# Patient Record
Sex: FEMALE | NOT HISPANIC OR LATINO | Employment: UNEMPLOYED | URBAN - METROPOLITAN AREA
[De-identification: names, ages, dates, MRNs, and addresses within clinical notes are randomized per-mention and may not be internally consistent; named-entity substitution may affect disease eponyms.]

---

## 2018-11-28 ENCOUNTER — OFFICE VISIT (OUTPATIENT)
Dept: FAMILY MEDICINE CLINIC | Facility: CLINIC | Age: 43
End: 2018-11-28

## 2018-11-28 VITALS
OXYGEN SATURATION: 94 % | TEMPERATURE: 96.4 F | BODY MASS INDEX: 29.49 KG/M2 | RESPIRATION RATE: 16 BRPM | HEIGHT: 65 IN | HEART RATE: 99 BPM | DIASTOLIC BLOOD PRESSURE: 84 MMHG | WEIGHT: 177 LBS | SYSTOLIC BLOOD PRESSURE: 126 MMHG

## 2018-11-28 DIAGNOSIS — R73.09 ELEVATED GLUCOSE LEVEL: ICD-10-CM

## 2018-11-28 DIAGNOSIS — N91.2 AMENORRHEA: ICD-10-CM

## 2018-11-28 DIAGNOSIS — Z23 NEED FOR INFLUENZA VACCINATION: ICD-10-CM

## 2018-11-28 DIAGNOSIS — R53.83 FATIGUE, UNSPECIFIED TYPE: ICD-10-CM

## 2018-11-28 DIAGNOSIS — E11.8 TYPE 2 DIABETES MELLITUS WITH COMPLICATION, WITHOUT LONG-TERM CURRENT USE OF INSULIN (HCC): Primary | ICD-10-CM

## 2018-11-28 LAB
25(OH)D3 SERPL-MCNC: 19.5 NG/ML
ALBUMIN SERPL-MCNC: 4.51 G/DL (ref 3.2–4.8)
ALBUMIN/GLOB SERPL: 1.4 G/DL (ref 1.5–2.5)
ALP SERPL-CCNC: 129 U/L (ref 25–100)
ALT SERPL W P-5'-P-CCNC: 89 U/L (ref 7–40)
ANION GAP SERPL CALCULATED.3IONS-SCNC: 4 MMOL/L (ref 3–11)
AST SERPL-CCNC: 48 U/L (ref 0–33)
BASOPHILS # BLD AUTO: 0.03 10*3/MM3 (ref 0–0.2)
BASOPHILS NFR BLD AUTO: 0.4 % (ref 0–1)
BILIRUB BLD-MCNC: NEGATIVE MG/DL
BILIRUB SERPL-MCNC: 0.2 MG/DL (ref 0.3–1.2)
BUN BLD-MCNC: 15 MG/DL (ref 9–23)
BUN/CREAT SERPL: 15.3 (ref 7–25)
CALCIUM SPEC-SCNC: 9.9 MG/DL (ref 8.7–10.4)
CHLORIDE SERPL-SCNC: 98 MMOL/L (ref 99–109)
CLARITY, POC: CLEAR
CO2 SERPL-SCNC: 28 MMOL/L (ref 20–31)
COLOR UR: YELLOW
CREAT BLD-MCNC: 0.98 MG/DL (ref 0.6–1.3)
DEPRECATED RDW RBC AUTO: 41.7 FL (ref 37–54)
EOSINOPHIL # BLD AUTO: 0.19 10*3/MM3 (ref 0–0.3)
EOSINOPHIL NFR BLD AUTO: 2.7 % (ref 0–3)
ERYTHROCYTE [DISTWIDTH] IN BLOOD BY AUTOMATED COUNT: 13.1 % (ref 11.3–14.5)
FOLATE SERPL-MCNC: 14.34 NG/ML (ref 3.2–20)
GFR SERPL CREATININE-BSD FRML MDRD: 59 ML/MIN/1.73
GFR SERPL CREATININE-BSD FRML MDRD: 72 ML/MIN/1.73
GLOBULIN UR ELPH-MCNC: 3.3 GM/DL
GLUCOSE BLD-MCNC: 606 MG/DL (ref 70–100)
GLUCOSE BLDC GLUCOMTR-MCNC: NORMAL MG/DL (ref 70–130)
GLUCOSE UR STRIP-MCNC: ABNORMAL MG/DL
HBA1C MFR BLD: 9.8 %
HBA1C MFR BLD: 9.8 % (ref 4.8–5.6)
HCT VFR BLD AUTO: 40.5 % (ref 34.5–44)
HGB BLD-MCNC: 12.2 G/DL (ref 11.5–15.5)
IMM GRANULOCYTES # BLD: 0.02 10*3/MM3 (ref 0–0.03)
IMM GRANULOCYTES NFR BLD: 0.3 % (ref 0–0.6)
KETONES UR QL: NEGATIVE
LEUKOCYTE EST, POC: NEGATIVE
LYMPHOCYTES # BLD AUTO: 2.09 10*3/MM3 (ref 0.6–4.8)
LYMPHOCYTES NFR BLD AUTO: 29.4 % (ref 24–44)
MCH RBC QN AUTO: 26.1 PG (ref 27–31)
MCHC RBC AUTO-ENTMCNC: 30.1 G/DL (ref 32–36)
MCV RBC AUTO: 86.7 FL (ref 80–99)
MONOCYTES # BLD AUTO: 0.61 10*3/MM3 (ref 0–1)
MONOCYTES NFR BLD AUTO: 8.6 % (ref 0–12)
NEUTROPHILS # BLD AUTO: 4.18 10*3/MM3 (ref 1.5–8.3)
NEUTROPHILS NFR BLD AUTO: 58.9 % (ref 41–71)
NITRITE UR-MCNC: NEGATIVE MG/ML
PH UR: 6 [PH] (ref 5–8)
PLATELET # BLD AUTO: 345 10*3/MM3 (ref 150–450)
PMV BLD AUTO: 10.7 FL (ref 6–12)
POTASSIUM BLD-SCNC: 5 MMOL/L (ref 3.5–5.5)
PROT SERPL-MCNC: 7.8 G/DL (ref 5.7–8.2)
PROT UR STRIP-MCNC: NEGATIVE MG/DL
RBC # BLD AUTO: 4.67 10*6/MM3 (ref 3.89–5.14)
RBC # UR STRIP: ABNORMAL /UL
SODIUM BLD-SCNC: 130 MMOL/L (ref 132–146)
SP GR UR: 1.01 (ref 1–1.03)
TSH SERPL DL<=0.05 MIU/L-ACNC: 1.27 MIU/ML (ref 0.35–5.35)
UROBILINOGEN UR QL: NORMAL
VIT B12 BLD-MCNC: 346 PG/ML (ref 211–911)
WBC NRBC COR # BLD: 7.1 10*3/MM3 (ref 3.5–10.8)

## 2018-11-28 PROCEDURE — 81003 URINALYSIS AUTO W/O SCOPE: CPT | Performed by: NURSE PRACTITIONER

## 2018-11-28 PROCEDURE — 90471 IMMUNIZATION ADMIN: CPT | Performed by: NURSE PRACTITIONER

## 2018-11-28 PROCEDURE — 83036 HEMOGLOBIN GLYCOSYLATED A1C: CPT | Performed by: NURSE PRACTITIONER

## 2018-11-28 PROCEDURE — 90686 IIV4 VACC NO PRSV 0.5 ML IM: CPT | Performed by: NURSE PRACTITIONER

## 2018-11-28 PROCEDURE — 36415 COLL VENOUS BLD VENIPUNCTURE: CPT | Performed by: NURSE PRACTITIONER

## 2018-11-28 PROCEDURE — 80053 COMPREHEN METABOLIC PANEL: CPT | Performed by: NURSE PRACTITIONER

## 2018-11-28 PROCEDURE — 82306 VITAMIN D 25 HYDROXY: CPT | Performed by: NURSE PRACTITIONER

## 2018-11-28 PROCEDURE — 84443 ASSAY THYROID STIM HORMONE: CPT | Performed by: NURSE PRACTITIONER

## 2018-11-28 PROCEDURE — 82607 VITAMIN B-12: CPT | Performed by: NURSE PRACTITIONER

## 2018-11-28 PROCEDURE — 85025 COMPLETE CBC W/AUTO DIFF WBC: CPT | Performed by: NURSE PRACTITIONER

## 2018-11-28 PROCEDURE — 82962 GLUCOSE BLOOD TEST: CPT | Performed by: NURSE PRACTITIONER

## 2018-11-28 PROCEDURE — 99205 OFFICE O/P NEW HI 60 MIN: CPT | Performed by: NURSE PRACTITIONER

## 2018-11-28 PROCEDURE — 82746 ASSAY OF FOLIC ACID SERUM: CPT | Performed by: NURSE PRACTITIONER

## 2018-11-28 RX ORDER — METFORMIN HYDROCHLORIDE EXTENDED-RELEASE TABLETS 1000 MG/1
1000 TABLET, FILM COATED, EXTENDED RELEASE ORAL
Qty: 30 TABLET | Refills: 1 | Status: SHIPPED | OUTPATIENT
Start: 2018-11-28 | End: 2018-11-29

## 2018-11-28 RX ORDER — BLOOD-GLUCOSE METER
1 KIT MISCELLANEOUS AS NEEDED
Qty: 1 EACH | Refills: 5 | Status: SHIPPED | OUTPATIENT
Start: 2018-11-28 | End: 2019-11-28

## 2018-11-28 NOTE — PROGRESS NOTES
"Gabriel Pinto is a 53 y.o. female who presents today to establish care and evaluation of her diabetes.     Chief Complaint   Patient presents with   • Menopause   • Diabetes       HPI     Patient found out one year ago that she had type 2 diabetes. She had been on medication until a few days ago when she ran out but does not know what the medication was. She was taking one \"little pill\" once a day. She has been having headaches, dizziness, polyuria, and polydipsia. She has not had a menses for one year. She saw an OBGYN in [facility] who thought she was pregnant one year ago, but then told her she was not. At that time she was having suprapubic pain that wrapped around into her low back but is no longer having the pain.    The following portions of the patient's history were reviewed and updated as appropriate: allergies, current medications, past family history, past medical history, past social history, past surgical history and problem list.     Past Medical History:   Diagnosis Date   • Diabetes mellitus (CMS/[facility])        History reviewed. No pertinent surgical history.    History reviewed. No pertinent family history.    Social History     Socioeconomic History   • Marital status:      Spouse name: Not on file   • Number of children: Not on file   • Years of education: Not on file   • Highest education level: Not on file   Social Needs   • Financial resource strain: Not on file   • Food insecurity - worry: Not on file   • Food insecurity - inability: Not on file   • Transportation needs - medical: Not on file   • Transportation needs - non-medical: Not on file   Occupational History   • Not on file   Tobacco Use   • Smoking status: Never Smoker   • Smokeless tobacco: Never Used   Substance and Sexual Activity   • Alcohol use: No     Frequency: Never   • Drug use: No   • Sexual activity: Defer   Other Topics Concern   • Not on file   Social History Narrative   • Not on file       No Known " "Allergies      Current Outpatient Medications:   •  glucose monitoring kit (FREESTYLE) monitoring kit, 1 each As Needed (type 2 diabetes)., Disp: 1 each, Rfl: 5  •  metFORMIN (FORTAMET) 1000 MG (OSM) 24 hr tablet, Take 1 tablet by mouth Daily With Breakfast., Disp: 30 tablet, Rfl: 1  No current facility-administered medications for this visit.     ROS    Review of Systems   Gastrointestinal: Positive for abdominal pain.   Endocrine: Positive for polydipsia and polyuria.   Genitourinary: Positive for flank pain.   Neurological: Positive for light-headedness and headache.   All other systems reviewed and are negative.      Visit Vitals  /84   Pulse 99   Temp 96.4 °F (35.8 °C) (Temporal)   Resp 16   Ht 165.1 cm (65\")   Wt 80.3 kg (177 lb)   SpO2 94%   BMI 29.45 kg/m²       Physical Exam     Physical Exam   Constitutional: She is oriented to person, place, and time. She appears well-developed and well-nourished.   HENT:   Head: Normocephalic and atraumatic.   Eyes: Conjunctivae and EOM are normal. Pupils are equal, round, and reactive to light.   Cardiovascular: Normal rate, regular rhythm, normal heart sounds and intact distal pulses. Exam reveals no gallop and no friction rub.   No murmur heard.  Pulmonary/Chest: Effort normal and breath sounds normal. No stridor. No respiratory distress. She has no wheezes. She has no rales.   Abdominal: Soft. Bowel sounds are normal.   Musculoskeletal: Normal range of motion.   Neurological: She is alert and oriented to person, place, and time. She has normal strength.   Skin: Skin is warm and dry. Capillary refill takes less than 2 seconds.   Psychiatric: She has a normal mood and affect. Her behavior is normal. Judgment and thought content normal.   Nursing note and vitals reviewed.      Assessment/Plan    Problem List Items Addressed This Visit     Amenorrhea    Relevant Orders    Ambulatory Referral to Gynecology    Type 2 diabetes mellitus with complication, without " "long-term current use of insulin (CMS/[facility]) - Primary    Relevant Medications    glucose monitoring kit (FREESTYLE) monitoring kit    metFORMIN (FORTAMET) 1000 MG (OSM) 24 hr tablet    Other Relevant Orders    POCT Glucose (Completed)    POC Glycosylated Hemoglobin (Hb A1C) (Completed)    Comprehensive Metabolic Panel    CBC Auto Differential    Hemoglobin A1c    TSH    Vitamin B12 & Folate    Vitamin D 1,25 Dihydroxy    POCT urinalysis dipstick, automated (Completed)    Ambulatory Referral for Diabetic Eye Exam-Ophthalmology      Other Visit Diagnoses     Elevated glucose level        Relevant Orders    POCT urinalysis dipstick, automated (Completed)    Fatigue, unspecified type        Relevant Orders    TSH    Vitamin B12 & Folate    Vitamin D 1,25 Dihydroxy    Need for influenza vaccination        Relevant Orders    Flucelvax Quad=>4Years ([id number]) (Completed)        Glucometer showed the results of her glucose as \"too high to measure\". Her fingerstick A1c was 9.8 Urinalysis shows elevated glucose but no proteinuria or ketones. Labs drawn today and advised patient and  that we would call with the results. Patient has never checked her glucose at home and doesn't have a glucometer. Pharmacy will instruct patient and  on how to use it and she will start checking blood sugars daily. Patient will restart medication today.  Patient or  were advised on symptoms of hypoglycemia or hyperglycemia and when they should call 911 or go to the emergency room.     Patient will return in 2 weeks for a recheck or will call or return sooner for any problems or concerns.      ELY Vera  "

## 2018-11-29 ENCOUNTER — TELEPHONE (OUTPATIENT)
Dept: FAMILY MEDICINE CLINIC | Facility: CLINIC | Age: 43
End: 2018-11-29

## 2018-11-29 NOTE — TELEPHONE ENCOUNTER
Per Milly Walters, ok to change to regular Metformin 500 2 po BID #120 RF 3. E-scribed to pharmacy,     ----- Message from Jackie Arteaga sent at 11/29/2018 11:41 AM EST -----  Regarding: RE: PLEASE CALL JOLIE G[facility]   Contact: [phone]  They are wanting to change the dosage from 1000mg to 500mg  ----- Message -----  From: Sonja Aleman MA  Sent: 11/29/2018  11:20 AM  To: Jackie Arteaga  Subject: RE: PLEASE CALL JOLIE G[facility]                 Wants 500 what?    ----- Message -----  From: Jackie Arteaga  Sent: 11/29/2018  10:42 AM  To: Sonja Aleman MA  Subject: PLEASE CALL JOLIE G[facility]                     metFORMIN (FORTAMET) 1000 MG (OSM) 24 hr tablet   WANTS 500

## 2018-11-30 ENCOUNTER — TELEPHONE (OUTPATIENT)
Dept: FAMILY MEDICINE CLINIC | Facility: CLINIC | Age: 43
End: 2018-11-30

## 2018-12-03 ENCOUNTER — DOCUMENTATION (OUTPATIENT)
Dept: FAMILY MEDICINE CLINIC | Facility: CLINIC | Age: 43
End: 2018-12-03

## 2018-12-03 NOTE — PROGRESS NOTES
Have tried numerous times to call patient or  to advise them about her lab results. I have received no answer or no call back. I am sending letter in the mail today to make sure she knows to increase medication and return for further evaluation.

## 2018-12-13 ENCOUNTER — OFFICE VISIT (OUTPATIENT)
Dept: FAMILY MEDICINE CLINIC | Facility: CLINIC | Age: 43
End: 2018-12-13

## 2018-12-13 ENCOUNTER — HOSPITAL ENCOUNTER (EMERGENCY)
Facility: HOSPITAL | Age: 43
Discharge: HOME | End: 2018-12-13
Attending: EMERGENCY MEDICINE | Admitting: EMERGENCY MEDICINE

## 2018-12-13 VITALS
WEIGHT: 174 LBS | OXYGEN SATURATION: 98 % | SYSTOLIC BLOOD PRESSURE: 144 MMHG | HEIGHT: 65 IN | BODY MASS INDEX: 28.99 KG/M2 | HEART RATE: 102 BPM | TEMPERATURE: 97.7 F | DIASTOLIC BLOOD PRESSURE: 88 MMHG

## 2018-12-13 VITALS
WEIGHT: 174 LBS | RESPIRATION RATE: 18 BRPM | HEART RATE: 75 BPM | BODY MASS INDEX: 28.99 KG/M2 | HEIGHT: 65 IN | TEMPERATURE: 98 F | OXYGEN SATURATION: 98 % | DIASTOLIC BLOOD PRESSURE: 97 MMHG | SYSTOLIC BLOOD PRESSURE: 130 MMHG

## 2018-12-13 DIAGNOSIS — R73.09 ELEVATED GLUCOSE: Primary | ICD-10-CM

## 2018-12-13 DIAGNOSIS — E11.8 TYPE 2 DIABETES MELLITUS WITH COMPLICATION, WITHOUT LONG-TERM CURRENT USE OF INSULIN (HCC): ICD-10-CM

## 2018-12-13 DIAGNOSIS — R73.09 ELEVATED HEMOGLOBIN A1C: ICD-10-CM

## 2018-12-13 DIAGNOSIS — R73.9 HYPERGLYCEMIA: Primary | ICD-10-CM

## 2018-12-13 DIAGNOSIS — E86.0 DEHYDRATION: ICD-10-CM

## 2018-12-13 LAB
ALBUMIN SERPL-MCNC: 4.88 G/DL (ref 3.2–4.8)
ALBUMIN/GLOB SERPL: 1.3 G/DL (ref 1.5–2.5)
ALP SERPL-CCNC: 113 U/L (ref 25–100)
ALT SERPL W P-5'-P-CCNC: 56 U/L (ref 7–40)
ANION GAP SERPL CALCULATED.3IONS-SCNC: 9 MMOL/L (ref 3–11)
AST SERPL-CCNC: 65 U/L (ref 0–33)
B-OH-BUTYR SERPL-SCNC: 0.31 MMOL/L
BACTERIA UR QL AUTO: ABNORMAL /HPF
BASOPHILS # BLD AUTO: 0.03 10*3/MM3 (ref 0–0.2)
BASOPHILS NFR BLD AUTO: 0.3 % (ref 0–1)
BILIRUB BLD-MCNC: NEGATIVE MG/DL
BILIRUB SERPL-MCNC: 0.4 MG/DL (ref 0.3–1.2)
BILIRUB UR QL STRIP: NEGATIVE
BUN BLD-MCNC: 16 MG/DL (ref 9–23)
BUN/CREAT SERPL: 16.3 (ref 7–25)
CALCIUM SPEC-SCNC: 10.4 MG/DL (ref 8.7–10.4)
CHLORIDE SERPL-SCNC: 96 MMOL/L (ref 99–109)
CLARITY UR: ABNORMAL
CLARITY, POC: CLEAR
CO2 SERPL-SCNC: 26 MMOL/L (ref 20–31)
COLOR UR: YELLOW
COLOR UR: YELLOW
CREAT BLD-MCNC: 0.98 MG/DL (ref 0.6–1.3)
DEPRECATED RDW RBC AUTO: 38.6 FL (ref 37–54)
EOSINOPHIL # BLD AUTO: 0.17 10*3/MM3 (ref 0–0.3)
EOSINOPHIL NFR BLD AUTO: 1.9 % (ref 0–3)
ERYTHROCYTE [DISTWIDTH] IN BLOOD BY AUTOMATED COUNT: 12.8 % (ref 11.3–14.5)
GFR SERPL CREATININE-BSD FRML MDRD: 59 ML/MIN/1.73
GFR SERPL CREATININE-BSD FRML MDRD: 72 ML/MIN/1.73
GLOBULIN UR ELPH-MCNC: 3.6 GM/DL
GLUCOSE BLD-MCNC: 399 MG/DL (ref 70–100)
GLUCOSE BLDC GLUCOMTR-MCNC: 264 MG/DL (ref 70–130)
GLUCOSE BLDC GLUCOMTR-MCNC: NORMAL MG/DL (ref 70–130)
GLUCOSE UR STRIP-MCNC: ABNORMAL MG/DL
GLUCOSE UR STRIP-MCNC: ABNORMAL MG/DL
HBA1C MFR BLD: 11.4 %
HCT VFR BLD AUTO: 40.2 % (ref 34.5–44)
HGB BLD-MCNC: 12.6 G/DL (ref 11.5–15.5)
HGB UR QL STRIP.AUTO: ABNORMAL
HOLD SPECIMEN: NORMAL
HOLD SPECIMEN: NORMAL
HYALINE CASTS UR QL AUTO: ABNORMAL /LPF
IMM GRANULOCYTES # BLD: 0.03 10*3/MM3 (ref 0–0.03)
IMM GRANULOCYTES NFR BLD: 0.3 % (ref 0–0.6)
KETONES UR QL STRIP: ABNORMAL
KETONES UR QL: NEGATIVE
LEUKOCYTE EST, POC: NEGATIVE
LEUKOCYTE ESTERASE UR QL STRIP.AUTO: NEGATIVE
LYMPHOCYTES # BLD AUTO: 3.6 10*3/MM3 (ref 0.6–4.8)
LYMPHOCYTES NFR BLD AUTO: 40.3 % (ref 24–44)
MCH RBC QN AUTO: 26.2 PG (ref 27–31)
MCHC RBC AUTO-ENTMCNC: 31.3 G/DL (ref 32–36)
MCV RBC AUTO: 83.6 FL (ref 80–99)
MONOCYTES # BLD AUTO: 0.51 10*3/MM3 (ref 0–1)
MONOCYTES NFR BLD AUTO: 5.7 % (ref 0–12)
NEUTROPHILS # BLD AUTO: 4.62 10*3/MM3 (ref 1.5–8.3)
NEUTROPHILS NFR BLD AUTO: 51.8 % (ref 41–71)
NITRITE UR QL STRIP: NEGATIVE
NITRITE UR-MCNC: NEGATIVE MG/ML
PH UR STRIP.AUTO: 8 [PH] (ref 5–8)
PH UR: 7 [PH] (ref 5–8)
PLATELET # BLD AUTO: 332 10*3/MM3 (ref 150–450)
PMV BLD AUTO: 9.9 FL (ref 6–12)
POTASSIUM BLD-SCNC: 5.9 MMOL/L (ref 3.5–5.5)
PROT SERPL-MCNC: 8.5 G/DL (ref 5.7–8.2)
PROT UR QL STRIP: ABNORMAL
PROT UR STRIP-MCNC: NEGATIVE MG/DL
RBC # BLD AUTO: 4.81 10*6/MM3 (ref 3.89–5.14)
RBC # UR STRIP: ABNORMAL /UL
RBC # UR: ABNORMAL /HPF
REF LAB TEST METHOD: ABNORMAL
SODIUM BLD-SCNC: 131 MMOL/L (ref 132–146)
SP GR UR STRIP: 1.03 (ref 1–1.03)
SP GR UR: 1.02 (ref 1–1.03)
SQUAMOUS #/AREA URNS HPF: ABNORMAL /HPF
UROBILINOGEN UR QL STRIP: ABNORMAL
UROBILINOGEN UR QL: NORMAL
WBC NRBC COR # BLD: 8.93 10*3/MM3 (ref 3.5–10.8)
WBC UR QL AUTO: ABNORMAL /HPF
WHOLE BLOOD HOLD SPECIMEN: NORMAL
WHOLE BLOOD HOLD SPECIMEN: NORMAL

## 2018-12-13 PROCEDURE — 81001 URINALYSIS AUTO W/SCOPE: CPT | Performed by: EMERGENCY MEDICINE

## 2018-12-13 PROCEDURE — 82962 GLUCOSE BLOOD TEST: CPT

## 2018-12-13 PROCEDURE — 82010 KETONE BODYS QUAN: CPT | Performed by: EMERGENCY MEDICINE

## 2018-12-13 PROCEDURE — 85025 COMPLETE CBC W/AUTO DIFF WBC: CPT | Performed by: EMERGENCY MEDICINE

## 2018-12-13 PROCEDURE — 80053 COMPREHEN METABOLIC PANEL: CPT | Performed by: EMERGENCY MEDICINE

## 2018-12-13 PROCEDURE — 81003 URINALYSIS AUTO W/O SCOPE: CPT | Performed by: NURSE PRACTITIONER

## 2018-12-13 PROCEDURE — 63710000001 INSULIN REGULAR HUMAN PER 5 UNITS: Performed by: EMERGENCY MEDICINE

## 2018-12-13 PROCEDURE — 99284 EMERGENCY DEPT VISIT MOD MDM: CPT

## 2018-12-13 PROCEDURE — 83036 HEMOGLOBIN GLYCOSYLATED A1C: CPT | Performed by: NURSE PRACTITIONER

## 2018-12-13 PROCEDURE — 82962 GLUCOSE BLOOD TEST: CPT | Performed by: NURSE PRACTITIONER

## 2018-12-13 PROCEDURE — 99213 OFFICE O/P EST LOW 20 MIN: CPT | Performed by: NURSE PRACTITIONER

## 2018-12-13 RX ORDER — SODIUM CHLORIDE 0.9 % (FLUSH) 0.9 %
10 SYRINGE (ML) INJECTION AS NEEDED
Status: DISCONTINUED | OUTPATIENT
Start: 2018-12-13 | End: 2018-12-13 | Stop reason: HOSPADM

## 2018-12-13 RX ORDER — GLYBURIDE 5 MG/1
5 TABLET ORAL
Qty: 30 TABLET | Refills: 1 | Status: SHIPPED | OUTPATIENT
Start: 2018-12-13 | End: 2019-03-06 | Stop reason: SDUPTHER

## 2018-12-13 RX ADMIN — SODIUM CHLORIDE 2000 ML: 9 INJECTION, SOLUTION INTRAVENOUS at 18:21

## 2018-12-13 RX ADMIN — INSULIN HUMAN 10 UNITS: 100 INJECTION, SOLUTION PARENTERAL at 18:21

## 2018-12-13 NOTE — PROGRESS NOTES
"Gabriel Pinto is a 53 y.o. female who presents for follow up on elevated blood sugar.    Chief Complaint   Patient presents with   • Abnormal Lab       Patient comes in today to follow up on type 2 diabetes. She states that she has been urinating large amounts and frequently. I asked if they received any of our correspondence because we had been trying to reach them. He did get a letter. I asked if they had been checking her blood sugars and he stated that they had not because the pharmacy said that the insurance would not cover the meter. I reiterated the importance of needing to know her blood sugars. I had the medical assistant do a glucose check and it registered as \"too high to read\".           Past Medical History:   Diagnosis Date   • Diabetes mellitus (CMS/[facility])        History reviewed. No pertinent surgical history.    History reviewed. No pertinent family history.    Social History     Socioeconomic History   • Marital status:      Spouse name: Not on file   • Number of children: Not on file   • Years of education: Not on file   • Highest education level: Not on file   Social Needs   • Financial resource strain: Not on file   • Food insecurity - worry: Not on file   • Food insecurity - inability: Not on file   • Transportation needs - medical: Not on file   • Transportation needs - non-medical: Not on file   Occupational History   • Not on file   Tobacco Use   • Smoking status: Never Smoker   • Smokeless tobacco: Never Used   Substance and Sexual Activity   • Alcohol use: No     Frequency: Never   • Drug use: No   • Sexual activity: Defer   Other Topics Concern   • Not on file   Social History Narrative   • Not on file       No Known Allergies    ROS    Review of Systems   Constitutional: Positive for fatigue.   Genitourinary: Positive for frequency.   All other systems reviewed and are negative.      Vitals:    12/13/18 1552   BP: 144/88   Pulse: 102   Temp: 97.7 °F (36.5 °C)   SpO2: 98% " "  Weight: 78.9 kg (174 lb)   Height: 165.1 cm (65\")       No current facility-administered medications for this visit.     Current Outpatient Medications:   •  glucose monitoring kit (FREESTYLE) monitoring kit, 1 each As Needed (type 2 diabetes)., Disp: 1 each, Rfl: 5  •  glyBURIDE (DIABETA) 5 MG tablet, Take 1 tablet by mouth Daily With Breakfast., Disp: 30 tablet, Rfl: 1  •  metFORMIN (GLUCOPHAGE) 1000 MG tablet, Take 1 tablet by mouth 2 (Two) Times a Day With Meals., Disp: 60 tablet, Rfl: 1    Facility-Administered Medications Ordered in Other Visits:   •  sodium chloride 0.9 % flush 10 mL, 10 mL, Intravenous, PRN, Jasen Shepard MD    PE    Physical Exam   Constitutional: She is oriented to person, place, and time. She appears well-developed and well-nourished.   HENT:   Head: Normocephalic and atraumatic.   Eyes: Conjunctivae are normal. Pupils are equal, round, and reactive to light.   Cardiovascular: Normal rate, regular rhythm and normal heart sounds. Exam reveals no gallop and no friction rub.   No murmur heard.  Pulmonary/Chest: Effort normal and breath sounds normal. No stridor. No respiratory distress. She has no wheezes. She has no rales.   Neurological: She is alert and oriented to person, place, and time.   Skin: Skin is warm and dry.   Psychiatric: She has a normal mood and affect. Her behavior is normal. Judgment and thought content normal.   Nursing note and vitals reviewed.       A/P    Gabriel was seen today for abnormal lab.    Diagnoses and all orders for this visit:    Elevated glucose  -     POCT Glucose  -     POCT urinalysis dipstick, automated    Type 2 diabetes mellitus with complication, without long-term current use of insulin (CMS/[facility])  -     metFORMIN (GLUCOPHAGE) 1000 MG tablet; Take 1 tablet by mouth 2 (Two) Times a Day With Meals.  -     glyBURIDE (DIABETA) 5 MG tablet; Take 1 tablet by mouth Daily With Breakfast.  -     POC Glycosylated Hemoglobin (Hb A1C)    Elevated " hemoglobin A1c    At this time patient is in need of a higher level of care than can be provided in the office. Discussed with patient, through an , how serious her condition is and what the complications could be including organ damage, coma, or even death. I advised that she go by ambulance immediately to [facility] for further evaluation and management of her elevated glucose. She is in agreement.    Plan of care reviewed with patient at the conclusion of today's visit. Patient verbalizes understanding of and agreement with management plan.    Return for in follow up after treatment at the hospital.     ELY Vera

## 2018-12-13 NOTE — ED PROVIDER NOTES
Subjective   DIAB X 1 YR  METFORM 2 WKS  [facility]    This pleasant 53-year-old woman presents to the emergency department with severely elevated blood sugars.  The patient and spent diagnosed with diabetes for roughly 1 year.  She was on one oral medication that caused diffuse pruritus and therefore she self discontinued.  Now over the last 2 weeks she has been placed on metformin.  She came to her doctor's office today and was found to have a blood sugar greater than 400.  She has now been referred to the emergency department for further care.  Her doctor has called in a prescription to [facility] for a new diabetes medication.    The patient reports polyuria and polydipsia.  She denies dysuria, cough, fevers, chills, any infectious symptoms.  She feels a little weak.  She has not been following any diabetic diet.            Review of Systems   Constitutional: Positive for fatigue. Negative for chills and fever.   HENT: Negative for congestion and sore throat.    Respiratory: Negative for shortness of breath.    Cardiovascular: Negative for chest pain.   Gastrointestinal: Negative for abdominal pain.   Genitourinary: Negative for dysuria.   Musculoskeletal: Negative for back pain.   Skin: Negative for rash.   Neurological: Positive for weakness. Negative for syncope, numbness and headaches.   Psychiatric/Behavioral: Negative for agitation and confusion.   All other systems reviewed and are negative.      Past Medical History:   Diagnosis Date   • Diabetes mellitus (CMS/[facility])        No Known Allergies    History reviewed. No pertinent surgical history.    History reviewed. No pertinent family history.    Social History     Socioeconomic History   • Marital status:      Spouse name: Not on file   • Number of children: Not on file   • Years of education: Not on file   • Highest education level: Not on file   Tobacco Use   • Smoking status: Never Smoker   • Smokeless tobacco: Never Used   Substance and  Sexual Activity   • Alcohol use: No     Frequency: Never   • Drug use: No   • Sexual activity: Defer           Objective   Physical Exam   Constitutional: She is oriented to person, place, and time. She appears well-developed and well-nourished.   HENT:   Head: Normocephalic and atraumatic.   Eyes: Conjunctivae are normal.   Neck: Trachea normal, normal range of motion and phonation normal. Neck supple. No JVD present.   Cardiovascular: Normal rate, regular rhythm and normal heart sounds.   Pulmonary/Chest: Effort normal and breath sounds normal. No respiratory distress.   Abdominal: Soft. There is no tenderness.   Musculoskeletal: Normal range of motion. She exhibits no edema.   Neurological: She is alert and oriented to person, place, and time. She has normal strength. Coordination normal.   Skin: Skin is warm and dry. She is not diaphoretic. No pallor.   Psychiatric: She has a normal mood and affect. Her speech is normal and behavior is normal.   Nursing note and vitals reviewed.      Procedures           ED Course  ED Course as of Dec 17 2327   Th Dec 13, 2018   2058 Dr. Shepard is bedside reevaluating the patient and discussing the treatment plan. He is stressing the importance of drinking water instead of sugary drinks. She will  her new medication tonight and start taking it as prescribed.   [TB]      ED Course User Index  [TB] Bo Oswald      Recent Results (from the past 24 hour(s))   POCT Glucose    Collection Time: 12/13/18  4:27 PM   Result Value Ref Range    Glucose  70 - 130 mg/dL   POC Glycosylated Hemoglobin (Hb A1C)    Collection Time: 12/13/18  4:28 PM   Result Value Ref Range    Hemoglobin A1C 11.4 %   POCT urinalysis dipstick, automated    Collection Time: 12/13/18  5:06 PM   Result Value Ref Range    Color Yellow Yellow, Straw, Dark Yellow, Bijal    Clarity, UA Clear Clear    Specific Gravity  1.020 1.005 - 1.030    pH, Urine 7.0 5.0 - 8.0    Leukocytes Negative Negative     Nitrite, UA Negative Negative    Protein, POC Negative Negative mg/dL    Glucose,  mg/dL (A) Negative, 1000 mg/dL (3+) mg/dL    Ketones, UA Negative Negative    Urobilinogen, UA Normal Normal    Bilirubin Negative Negative    Blood, UA Trace (A) Negative   Comprehensive Metabolic Panel    Collection Time: 12/13/18  5:55 PM   Result Value Ref Range    Glucose 399 (H) 70 - 100 mg/dL    BUN 16 9 - 23 mg/dL    Creatinine 0.98 0.60 - 1.30 mg/dL    Sodium 131 (L) 132 - 146 mmol/L    Potassium 5.9 (H) 3.5 - 5.5 mmol/L    Chloride 96 (L) 99 - 109 mmol/L    CO2 26.0 20.0 - 31.0 mmol/L    Calcium 10.4 8.7 - 10.4 mg/dL    Total Protein 8.5 (H) 5.7 - 8.2 g/dL    Albumin 4.88 (H) 3.20 - 4.80 g/dL    ALT (SGPT) 56 (H) 7 - 40 U/L    AST (SGOT) 65 (H) 0 - 33 U/L    Alkaline Phosphatase 113 (H) 25 - 100 U/L    Total Bilirubin 0.4 0.3 - 1.2 mg/dL    eGFR Non African Amer 59 (L) >60 mL/min/1.73    eGFR  African Amer 72 >60 mL/min/1.73    Globulin 3.6 gm/dL    A/G Ratio 1.3 (L) 1.5 - 2.5 g/dL    BUN/Creatinine Ratio 16.3 7.0 - 25.0    Anion Gap 9.0 3.0 - 11.0 mmol/L   Light Blue Top    Collection Time: 12/13/18  5:55 PM   Result Value Ref Range    Extra Tube hold for add-on    Green Top (Gel)    Collection Time: 12/13/18  5:55 PM   Result Value Ref Range    Extra Tube Hold for add-ons.    Lavender Top    Collection Time: 12/13/18  5:55 PM   Result Value Ref Range    Extra Tube hold for add-on    Gold Top - SST    Collection Time: 12/13/18  5:55 PM   Result Value Ref Range    Extra Tube Hold for add-ons.    CBC Auto Differential    Collection Time: 12/13/18  5:55 PM   Result Value Ref Range    WBC 8.93 3.50 - 10.80 10*3/mm3    RBC 4.81 3.89 - 5.14 10*6/mm3    Hemoglobin 12.6 11.5 - 15.5 g/dL    Hematocrit 40.2 34.5 - 44.0 %    MCV 83.6 80.0 - 99.0 fL    MCH 26.2 (L) 27.0 - 31.0 pg    MCHC 31.3 (L) 32.0 - 36.0 g/dL    RDW 12.8 11.3 - 14.5 %    RDW-SD 38.6 37.0 - 54.0 fl    MPV 9.9 6.0 - 12.0 fL    Platelets 332 150 - 450 10*3/mm3     Neutrophil % 51.8 41.0 - 71.0 %    Lymphocyte % 40.3 24.0 - 44.0 %    Monocyte % 5.7 0.0 - 12.0 %    Eosinophil % 1.9 0.0 - 3.0 %    Basophil % 0.3 0.0 - 1.0 %    Immature Grans % 0.3 0.0 - 0.6 %    Neutrophils, Absolute 4.62 1.50 - 8.30 10*3/mm3    Lymphocytes, Absolute 3.60 0.60 - 4.80 10*3/mm3    Monocytes, Absolute 0.51 0.00 - 1.00 10*3/mm3    Eosinophils, Absolute 0.17 0.00 - 0.30 10*3/mm3    Basophils, Absolute 0.03 0.00 - 0.20 10*3/mm3    Immature Grans, Absolute 0.03 0.00 - 0.03 10*3/mm3   Beta Hydroxybutyrate Quantitative    Collection Time: 12/13/18  5:55 PM   Result Value Ref Range    Beta-Hydroxybutyrate Quant 0.310 <=0.500 mmol/L   Urinalysis With Microscopic If Indicated (No Culture) - Urine, Clean Catch    Collection Time: 12/13/18  6:37 PM   Result Value Ref Range    Color, UA Yellow Yellow, Straw    Appearance, UA Cloudy (A) Clear    pH, UA 8.0 5.0 - 8.0    Specific Gravity, UA 1.032 (H) 1.001 - 1.030    Glucose, UA >=1000 mg/dL (3+) (A) Negative    Ketones, UA Trace (A) Negative    Bilirubin, UA Negative Negative    Blood, UA Trace (A) Negative    Protein, UA 30 mg/dL (1+) (A) Negative    Leuk Esterase, UA Negative Negative    Nitrite, UA Negative Negative    Urobilinogen, UA 0.2 E.U./dL 0.2 - 1.0 E.U./dL   Urinalysis, Microscopic Only - Urine, Clean Catch    Collection Time: 12/13/18  6:37 PM   Result Value Ref Range    RBC, UA 0-2 None Seen, 0-2 /HPF    WBC, UA 6-12 (A) None Seen, 0-2 /HPF    Bacteria, UA Trace None Seen, Trace /HPF    Squamous Epithelial Cells, UA 0-2 None Seen, 0-2 /HPF    Hyaline Casts, UA None Seen 0 - 6 /LPF    Methodology Automated Microscopy    POC Glucose Once    Collection Time: 12/13/18  7:50 PM   Result Value Ref Range    Glucose 264 (H) 70 - 130 mg/dL     Note: In addition to lab results from this visit, the labs listed above may include labs taken at another facility or during a different encounter within the last 24 hours. Please correlate lab times with ED  admission and discharge times for further clarification of the services performed during this visit.    No orders to display     Vitals:    12/13/18 1800 12/13/18 1955 12/13/18 1956 12/13/18 2008   BP: 138/84 137/94     Patient Position:       Pulse:    80   Resp:       Temp:       TempSrc:       SpO2:   98%    Weight:       Height:         Medications   sodium chloride 0.9 % flush 10 mL (not administered)   sodium chloride 0.9 % bolus 2,000 mL (0 mL Intravenous Stopped 12/13/18 2007)   insulin regular (humuLIN R,novoLIN R) injection 10 Units (10 Units Subcutaneous Given 12/13/18 1821)     ECG/EMG Results (last 24 hours)     ** No results found for the last 24 hours. **                    [facility]      Final diagnoses:   Hyperglycemia   Dehydration            Bo Oswald  12/13/18 2026     Documentation assistance provided by jenifer Oswald.  Information recorded by the scribe was done at my direction and has been verified and validated by me.     Jasen Shepard MD  12/17/18 2328       Jasen Shepard MD  01/23/19 2034

## 2018-12-14 NOTE — DISCHARGE INSTRUCTIONS
Stick to a strict hyperglycemia diabetic diet.     Fill the script for a new diabetic medication and take it as you are directed.     Push water and do not drink anything with sugar in it.     Check your blood sugar with a finger stick at least two times per day, or more frequently if your doctor has instructed this.     Please review the medications you are supposed to be taking according to prior physician recommendations. I have not changed your home medications during this visit. If your discharge instructions indicate that I have changed your home medications, this is not the case, and you should disregard. If you have any questions about the medication you should be taking at home, please call your physician.

## 2019-03-06 DIAGNOSIS — E11.8 TYPE 2 DIABETES MELLITUS WITH COMPLICATION, WITHOUT LONG-TERM CURRENT USE OF INSULIN (HCC): ICD-10-CM

## 2019-03-07 RX ORDER — GLYBURIDE 5 MG/1
TABLET ORAL
Qty: 30 TABLET | Refills: 1 | Status: SHIPPED | OUTPATIENT
Start: 2019-03-07 | End: 2021-01-06

## 2021-01-06 ENCOUNTER — OFFICE VISIT (OUTPATIENT)
Dept: FAMILY MEDICINE CLINIC | Facility: CLINIC | Age: 46
End: 2021-01-06

## 2021-01-06 VITALS
RESPIRATION RATE: 16 BRPM | BODY MASS INDEX: 27.42 KG/M2 | SYSTOLIC BLOOD PRESSURE: 128 MMHG | OXYGEN SATURATION: 100 % | WEIGHT: 164.6 LBS | HEIGHT: 65 IN | TEMPERATURE: 96.8 F | HEART RATE: 88 BPM | DIASTOLIC BLOOD PRESSURE: 76 MMHG

## 2021-01-06 DIAGNOSIS — Z12.4 CERVICAL CANCER SCREENING: ICD-10-CM

## 2021-01-06 DIAGNOSIS — E11.8 TYPE 2 DIABETES MELLITUS WITH COMPLICATION, WITHOUT LONG-TERM CURRENT USE OF INSULIN (HCC): Primary | ICD-10-CM

## 2021-01-06 LAB
BASOPHILS # BLD AUTO: 0.04 10*3/MM3 (ref 0–0.2)
BASOPHILS NFR BLD AUTO: 0.6 % (ref 0–1.5)
DEPRECATED RDW RBC AUTO: 36 FL (ref 37–54)
EOSINOPHIL # BLD AUTO: 0.13 10*3/MM3 (ref 0–0.4)
EOSINOPHIL NFR BLD AUTO: 2 % (ref 0.3–6.2)
ERYTHROCYTE [DISTWIDTH] IN BLOOD BY AUTOMATED COUNT: 11.4 % (ref 12.3–15.4)
EXPIRATION DATE: ABNORMAL
HBA1C MFR BLD: 12.6 %
HCT VFR BLD AUTO: 40.9 % (ref 34–46.6)
HGB BLD-MCNC: 12.4 G/DL (ref 12–15.9)
IMM GRANULOCYTES # BLD AUTO: 0.01 10*3/MM3 (ref 0–0.05)
IMM GRANULOCYTES NFR BLD AUTO: 0.2 % (ref 0–0.5)
LYMPHOCYTES # BLD AUTO: 2.4 10*3/MM3 (ref 0.7–3.1)
LYMPHOCYTES NFR BLD AUTO: 37.7 % (ref 19.6–45.3)
Lab: ABNORMAL
MCH RBC QN AUTO: 26.2 PG (ref 26.6–33)
MCHC RBC AUTO-ENTMCNC: 30.3 G/DL (ref 31.5–35.7)
MCV RBC AUTO: 86.5 FL (ref 79–97)
MONOCYTES # BLD AUTO: 0.67 10*3/MM3 (ref 0.1–0.9)
MONOCYTES NFR BLD AUTO: 10.5 % (ref 5–12)
NEUTROPHILS NFR BLD AUTO: 3.11 10*3/MM3 (ref 1.7–7)
NEUTROPHILS NFR BLD AUTO: 49 % (ref 42.7–76)
NRBC BLD AUTO-RTO: 0 /100 WBC (ref 0–0.2)
PLATELET # BLD AUTO: 274 10*3/MM3 (ref 140–450)
PMV BLD AUTO: 10.7 FL (ref 6–12)
RBC # BLD AUTO: 4.73 10*6/MM3 (ref 3.77–5.28)
WBC # BLD AUTO: 6.36 10*3/MM3 (ref 3.4–10.8)

## 2021-01-06 PROCEDURE — 83036 HEMOGLOBIN GLYCOSYLATED A1C: CPT | Performed by: FAMILY MEDICINE

## 2021-01-06 PROCEDURE — 80053 COMPREHEN METABOLIC PANEL: CPT | Performed by: FAMILY MEDICINE

## 2021-01-06 PROCEDURE — 99214 OFFICE O/P EST MOD 30 MIN: CPT | Performed by: FAMILY MEDICINE

## 2021-01-06 PROCEDURE — 85025 COMPLETE CBC W/AUTO DIFF WBC: CPT | Performed by: FAMILY MEDICINE

## 2021-01-06 PROCEDURE — 90471 IMMUNIZATION ADMIN: CPT | Performed by: FAMILY MEDICINE

## 2021-01-06 PROCEDURE — 90686 IIV4 VACC NO PRSV 0.5 ML IM: CPT | Performed by: FAMILY MEDICINE

## 2021-01-06 PROCEDURE — 80061 LIPID PANEL: CPT | Performed by: FAMILY MEDICINE

## 2021-01-06 PROCEDURE — 84443 ASSAY THYROID STIM HORMONE: CPT | Performed by: FAMILY MEDICINE

## 2021-01-06 RX ORDER — GLIMEPIRIDE 4 MG/1
4 TABLET ORAL
Qty: 30 TABLET | Refills: 3 | Status: SHIPPED | OUTPATIENT
Start: 2021-01-06 | End: 2021-08-23

## 2021-01-06 RX ORDER — BLOOD-GLUCOSE METER
1 KIT MISCELLANEOUS 2 TIMES DAILY
Qty: 1 EACH | Refills: 0 | Status: SHIPPED | OUTPATIENT
Start: 2021-01-06 | End: 2021-09-14 | Stop reason: SDUPTHER

## 2021-01-06 RX ORDER — LANCETS 30 GAUGE
1 EACH MISCELLANEOUS 2 TIMES DAILY
Qty: 100 EACH | Refills: 3 | Status: SHIPPED | OUTPATIENT
Start: 2021-01-06 | End: 2021-09-14 | Stop reason: SDUPTHER

## 2021-01-06 NOTE — PROGRESS NOTES
"Chief Complaint  Diabetes (f/u)    Subjective        Here with , yaneli St presents to [facility] for   Has not been seen in 2 years, last visit 2018 with Milly Walters. Has not been on med for DM in > 1 year.  Diabetes  She presents for her follow-up diabetic visit. She has type 2 diabetes mellitus. Her disease course has been worsening. There are no hypoglycemic associated symptoms. There are no diabetic associated symptoms. There are no hypoglycemic complications. Symptoms are worsening. There are no diabetic complications. Risk factors for coronary artery disease include diabetes mellitus, hypertension and post-menopausal. When asked about current treatments, none were reported. Her weight is stable. She is following a generally healthy diet. When asked about meal planning, she reported none. She has not had a previous visit with a dietitian. She participates in exercise intermittently. Her home blood glucose trend is increasing steadily. An ACE inhibitor/angiotensin II receptor blocker is not being taken. She does not see a podiatrist.Eye exam is not current.       Objective   Vital Signs:   /76   Pulse 88   Temp 96.8 °F (36 °C) (Temporal)   Resp 16   Ht 165.1 cm (65\")   Wt 74.7 kg (164 lb 9.6 oz)   SpO2 100%   BMI 27.39 kg/m²     Physical Exam  Vitals signs and nursing note reviewed.   Constitutional:       General: She is not in acute distress.     Appearance: She is well-developed. She is not diaphoretic.   HENT:      Right Ear: External ear normal.      Left Ear: External ear normal.      Nose: Nose normal.   Eyes:      Conjunctiva/sclera: Conjunctivae normal.      Pupils: Pupils are equal, round, and reactive to light.   Neck:      Musculoskeletal: Normal range of motion and neck supple.      Thyroid: No thyromegaly.   Cardiovascular:      Rate and Rhythm: Normal rate and regular rhythm.      Heart sounds: Normal heart sounds. " No murmur.   Pulmonary:      Effort: Pulmonary effort is normal. No respiratory distress.      Breath sounds: Normal breath sounds. No wheezing.   Abdominal:      General: Bowel sounds are normal. There is no distension.      Palpations: Abdomen is soft. There is no mass.      Tenderness: There is no abdominal tenderness. There is no guarding or rebound.      Hernia: No hernia is present.   Musculoskeletal: Normal range of motion.         General: No tenderness.   Lymphadenopathy:      Cervical: No cervical adenopathy.   Skin:     General: Skin is warm and dry.      Coloration: Skin is not pale.      Findings: No erythema or rash.   Neurological:      Mental Status: She is alert and oriented to person, place, and time.      Deep Tendon Reflexes: Reflexes are normal and symmetric.   Psychiatric:         Behavior: Behavior normal.         Thought Content: Thought content normal.         Judgment: Judgment normal.        Result Review :   The following data was reviewed by: Cheryle Knight DO on 01/06/2021:  Most Recent A1C    HGBA1C Most Recent 1/6/21   Hemoglobin A1C 12.6                     Assessment and Plan    Problem List Items Addressed This Visit        Endocrine and Metabolic    Type 2 diabetes mellitus with complication, without long-term current use of insulin (CMS/[facility]) - Primary    Relevant Medications    glimepiride (Amaryl) 4 MG tablet    metFORMIN (Glucophage) 500 MG tablet    glucose monitor monitoring kit    glucose blood test strip    Lancets misc    Other Relevant Orders    POC Glycosylated Hemoglobin (Hb A1C) (Completed)    CBC & Differential    Comprehensive Metabolic Panel    Lipid Panel    TSH    Ambulatory Referral to Nutrition Services    CBC Auto Differential      Other Visit Diagnoses     Cervical cancer screening        Relevant Orders    Ambulatory Referral to Obstetrics / Gynecology      Will add back Glimeperide and Metformin. Recheck blood sugar in 6 weeks. If no improvement may need  endo eval.    I spent 30 minutes caring for Gabriel on this date of service. This time includes time spent by me in the following activities:preparing for the visit, reviewing tests, obtaining and/or reviewing a separately obtained history, performing a medically appropriate examination and/or evaluation , counseling and educating the patient/family/caregiver, ordering medications, tests, or procedures, referring and communicating with other health care professionals , documenting information in the medical record, independently interpreting results and communicating that information with the patient/family/caregiver and care coordination  Follow Up   Return in about 6 weeks (around 2/17/2021).  Patient was given instructions and counseling regarding her condition or for health maintenance advice. Please see specific information pulled into the AVS if appropriate.

## 2021-01-07 LAB
ALBUMIN SERPL-MCNC: 4.5 G/DL (ref 3.5–5.2)
ALBUMIN/GLOB SERPL: 1.1 G/DL
ALP SERPL-CCNC: 96 U/L (ref 39–117)
ALT SERPL W P-5'-P-CCNC: 19 U/L (ref 1–33)
ANION GAP SERPL CALCULATED.3IONS-SCNC: 14.2 MMOL/L (ref 5–15)
AST SERPL-CCNC: 18 U/L (ref 1–32)
BILIRUB SERPL-MCNC: 0.3 MG/DL (ref 0–1.2)
BUN SERPL-MCNC: 12 MG/DL (ref 6–20)
BUN/CREAT SERPL: 21.1 (ref 7–25)
CALCIUM SPEC-SCNC: 10.4 MG/DL (ref 8.6–10.5)
CHLORIDE SERPL-SCNC: 98 MMOL/L (ref 98–107)
CHOLEST SERPL-MCNC: 229 MG/DL (ref 0–200)
CO2 SERPL-SCNC: 23.8 MMOL/L (ref 22–29)
CREAT SERPL-MCNC: 0.57 MG/DL (ref 0.57–1)
GFR SERPL CREATININE-BSD FRML MDRD: 114 ML/MIN/1.73
GFR SERPL CREATININE-BSD FRML MDRD: 138 ML/MIN/1.73
GLOBULIN UR ELPH-MCNC: 4.2 GM/DL
GLUCOSE SERPL-MCNC: 378 MG/DL (ref 65–99)
HDLC SERPL-MCNC: 47 MG/DL (ref 40–60)
LDLC SERPL CALC-MCNC: 149 MG/DL (ref 0–100)
LDLC/HDLC SERPL: 3.11 {RATIO}
POTASSIUM SERPL-SCNC: 5 MMOL/L (ref 3.5–5.2)
PROT SERPL-MCNC: 8.7 G/DL (ref 6–8.5)
SODIUM SERPL-SCNC: 136 MMOL/L (ref 136–145)
TRIGL SERPL-MCNC: 180 MG/DL (ref 0–150)
TSH SERPL DL<=0.05 MIU/L-ACNC: 2.17 UIU/ML (ref 0.27–4.2)
VLDLC SERPL-MCNC: 33 MG/DL (ref 5–40)

## 2021-08-21 DIAGNOSIS — E11.8 TYPE 2 DIABETES MELLITUS WITH COMPLICATION, WITHOUT LONG-TERM CURRENT USE OF INSULIN (HCC): ICD-10-CM

## 2021-08-23 RX ORDER — GLIMEPIRIDE 4 MG/1
TABLET ORAL
Qty: 30 TABLET | Refills: 0 | Status: SHIPPED | OUTPATIENT
Start: 2021-08-23 | End: 2021-09-14 | Stop reason: SDUPTHER

## 2021-09-14 ENCOUNTER — OFFICE VISIT (OUTPATIENT)
Dept: FAMILY MEDICINE CLINIC | Facility: CLINIC | Age: 46
End: 2021-09-14

## 2021-09-14 VITALS
OXYGEN SATURATION: 98 % | HEIGHT: 66 IN | DIASTOLIC BLOOD PRESSURE: 90 MMHG | HEART RATE: 92 BPM | WEIGHT: 167 LBS | TEMPERATURE: 98.5 F | BODY MASS INDEX: 26.84 KG/M2 | SYSTOLIC BLOOD PRESSURE: 142 MMHG | RESPIRATION RATE: 18 BRPM

## 2021-09-14 DIAGNOSIS — E11.8 TYPE 2 DIABETES MELLITUS WITH COMPLICATION, WITHOUT LONG-TERM CURRENT USE OF INSULIN (HCC): Primary | ICD-10-CM

## 2021-09-14 DIAGNOSIS — R30.0 DYSURIA: ICD-10-CM

## 2021-09-14 DIAGNOSIS — N30.01 ACUTE CYSTITIS WITH HEMATURIA: ICD-10-CM

## 2021-09-14 LAB
BILIRUB BLD-MCNC: NEGATIVE MG/DL
CLARITY, POC: CLEAR
COLOR UR: ABNORMAL
GLUCOSE UR STRIP-MCNC: ABNORMAL MG/DL
HBA1C MFR BLD: 9.8 %
KETONES UR QL: ABNORMAL
LEUKOCYTE EST, POC: ABNORMAL
NITRITE UR-MCNC: NEGATIVE MG/ML
PH UR: 6 [PH] (ref 5–8)
PROT UR STRIP-MCNC: ABNORMAL MG/DL
RBC # UR STRIP: ABNORMAL /UL
SP GR UR: 1.02 (ref 1–1.03)
UROBILINOGEN UR QL: NORMAL

## 2021-09-14 PROCEDURE — 87086 URINE CULTURE/COLONY COUNT: CPT | Performed by: NURSE PRACTITIONER

## 2021-09-14 PROCEDURE — 87077 CULTURE AEROBIC IDENTIFY: CPT | Performed by: NURSE PRACTITIONER

## 2021-09-14 PROCEDURE — 81003 URINALYSIS AUTO W/O SCOPE: CPT | Performed by: NURSE PRACTITIONER

## 2021-09-14 PROCEDURE — 87186 SC STD MICRODIL/AGAR DIL: CPT | Performed by: NURSE PRACTITIONER

## 2021-09-14 PROCEDURE — 99214 OFFICE O/P EST MOD 30 MIN: CPT | Performed by: NURSE PRACTITIONER

## 2021-09-14 RX ORDER — BLOOD-GLUCOSE METER
1 KIT MISCELLANEOUS 2 TIMES DAILY
Qty: 1 EACH | Refills: 0 | Status: SHIPPED | OUTPATIENT
Start: 2021-09-14

## 2021-09-14 RX ORDER — SULFAMETHOXAZOLE AND TRIMETHOPRIM 800; 160 MG/1; MG/1
1 TABLET ORAL 2 TIMES DAILY
Qty: 14 TABLET | Refills: 0 | Status: SHIPPED | OUTPATIENT
Start: 2021-09-14 | End: 2021-09-21

## 2021-09-14 RX ORDER — LANCETS 30 GAUGE
1 EACH MISCELLANEOUS 2 TIMES DAILY
Qty: 100 EACH | Refills: 3 | Status: SHIPPED | OUTPATIENT
Start: 2021-09-14 | End: 2023-01-19 | Stop reason: SDUPTHER

## 2021-09-14 RX ORDER — GLIMEPIRIDE 4 MG/1
4 TABLET ORAL
Qty: 90 TABLET | Refills: 0 | Status: SHIPPED | OUTPATIENT
Start: 2021-09-14 | End: 2022-05-04

## 2021-09-14 NOTE — PROGRESS NOTES
Follow Up Office Visit      Patient Name: Gabriel St  : 1975   MRN: [id number]   Care Team: Patient Care Team:  Cheryle Knight DO as PCP - General (Family Medicine)    Chief Complaint:    Chief Complaint   Patient presents with   • Follow-up     diabetes       Diabetes  She presents for her follow-up diabetic visit. She has type 2 diabetes mellitus. Hypoglycemia symptoms include dizziness. Pertinent negatives for diabetes include no blurred vision and no foot ulcerations. Risk factors for coronary artery disease include sedentary lifestyle. Current diabetic treatment includes oral agent (dual therapy). She is compliant with treatment all of the time. Her weight is increasing steadily. She is following a diabetic diet. She rarely participates in exercise. Her home blood glucose trend is decreasing steadily (HGB A1C improved from last visit 8 months ago from 12.6 to 9.8 today). (States she does not check glucose often and does not have glucose log to review) An ACE inhibitor/angiotensin II receptor blocker is not being taken. She does not see a podiatrist.Eye exam is not current (States no appointment scheduled yet).   Urinary Tract Infection   This is a new problem. The current episode started 1 to 4 weeks ago. The problem occurs every urination. The problem has been gradually worsening. The quality of the pain is described as burning. There has been no fever. Associated symptoms include flank pain, frequency, hematuria and urgency. Pertinent negatives include no chills, nausea, possible pregnancy or vomiting. She has tried nothing for the symptoms. The treatment provided no relief.       Subjective      Review of Systems:   Review of Systems   Constitutional: Negative for chills.   Eyes: Negative for blurred vision.   Gastrointestinal: Negative for nausea and vomiting.   Genitourinary: Positive for flank pain, frequency, hematuria and urgency.   Neurological: Positive for dizziness.       I  "have reviewed and the following portions of the patient's history were updated as appropriate: past family history, past medical history, past social history, past surgical history and problem list.    Medications:     Current Outpatient Medications:   •  glimepiride (AMARYL) 4 MG tablet, Take 1 tablet by mouth Every Morning Before Breakfast., Disp: 90 tablet, Rfl: 0  •  glucose blood test strip, 1 each by Other route 2 (two) times a day. For E11.9, Disp: 100 each, Rfl: 3  •  glucose monitor monitoring kit, 1 each 2 (two) times a day. For E11.9, Disp: 1 each, Rfl: 0  •  Lancets misc, 1 each 2 (two) times a day. For E11.9, Disp: 100 each, Rfl: 3  •  metFORMIN (GLUCOPHAGE) 500 MG tablet, TAKE 1 TABLET BY MOUTH TWICE DAILY WITH A MEAL, Disp: 60 tablet, Rfl: 0  •  sulfamethoxazole-trimethoprim (BACTRIM DS,SEPTRA DS) 800-160 MG per tablet, Take 1 tablet by mouth 2 (Two) Times a Day for 7 days., Disp: 14 tablet, Rfl: 0    Allergies:   No Known Allergies    Objective     Physical Exam  Vital Signs:   Vitals:    09/14/21 1704   BP: 142/90   Pulse: 92   Resp: 18   Temp: 98.5 °F (36.9 °C)   SpO2: 98%   Weight: 75.8 kg (167 lb)   Height: 167.6 cm (66\")     Body mass index is 26.95 kg/m².     Physical Exam  Vitals and nursing note reviewed.   Constitutional:       General: She is not in acute distress.     Appearance: Normal appearance.   HENT:      Head: Normocephalic.      Right Ear: External ear normal.      Left Ear: External ear normal.      Nose: Nose normal.   Eyes:      Extraocular Movements: Extraocular movements intact.      Conjunctiva/sclera: Conjunctivae normal.      Pupils: Pupils are equal, round, and reactive to light.   Cardiovascular:      Rate and Rhythm: Normal rate and regular rhythm.      Heart sounds: Normal heart sounds.   Pulmonary:      Effort: Pulmonary effort is normal.      Breath sounds: Normal breath sounds.   Abdominal:      General: Bowel sounds are normal. There is no distension.      " Palpations: Abdomen is soft.      Tenderness: There is abdominal tenderness in the suprapubic area. There is no guarding.   Musculoskeletal:      Right lower leg: No edema.      Left lower leg: No edema.   Skin:     General: Skin is warm and dry.   Neurological:      Mental Status: She is alert and oriented to person, place, and time.   Psychiatric:         Mood and Affect: Mood normal.         Behavior: Behavior normal.         Thought Content: Thought content normal.         Judgment: Judgment normal.         Assessment / Plan      Assessment/Plan  Problems Addressed This Visit  Diagnoses and all orders for this visit:    1. Type 2 diabetes mellitus with complication, without long-term current use of insulin (CMS/[facility]) (Primary)  Assessment & Plan:  Diabetes is improving with treatment.   Continue current treatment regimen.  Reminded to bring in blood sugar diary at next visit.  Dietary recommendations for ADA diet.  Regular aerobic exercise.  Discussed foot care.  Reminded to get yearly retinal exam.  Instructed to monitor glucose daily at home  Diabetes will be reassessed with PCP at next follow-up.    Orders:  -     POC Glycosylated Hemoglobin (Hb A1C)  -     glimepiride (AMARYL) 4 MG tablet; Take 1 tablet by mouth Every Morning Before Breakfast.  Dispense: 90 tablet; Refill: 0  -     glucose blood test strip; 1 each by Other route 2 (two) times a day. For E11.9  Dispense: 100 each; Refill: 3  -     glucose monitor monitoring kit; 1 each 2 (two) times a day. For E11.9  Dispense: 1 each; Refill: 0  -     Lancets misc; 1 each 2 (two) times a day. For E11.9  Dispense: 100 each; Refill: 3    2. Acute cystitis with hematuria  Assessment & Plan:  Take meds as directed  Wipe from front to back  No bathing in tub  Drink plenty of water  Cranberry juice  RTO or call 2-3 days if no improvement  Urine culture sent      Orders:  -     sulfamethoxazole-trimethoprim (BACTRIM DS,SEPTRA DS) 800-160 MG per tablet; Take 1 tablet  by mouth 2 (Two) Times a Day for 7 days.  Dispense: 14 tablet; Refill: 0  -     Cancel: Urine Culture - Urine, Urine, Clean Catch; Future  -     Urine Culture - Urine, Urine, Clean Catch    3. Dysuria  Assessment & Plan:  UA in office abnormal  Urine culture sent    Orders:  -     POCT urinalysis dipstick, automated      Plan of care reviewed with patient at the conclusion of today's visit. Education was provided regarding diagnosis and management.  Patient verbalizes understanding of and agreement with management plan.    Follow Up: Return in about 3 months (around 12/14/2021) for Annual physcal and f/u Diabetes with PCP Dr. Cosme.        Zara Teran, [facility] [place]

## 2021-09-16 LAB — BACTERIA SPEC AEROBE CULT: ABNORMAL

## 2021-09-20 NOTE — ASSESSMENT & PLAN NOTE
Take meds as directed  Wipe from front to back  No bathing in tub  Drink plenty of water  Cranberry juice  RTO or call 2-3 days if no improvement  Urine culture sent

## 2021-09-20 NOTE — ASSESSMENT & PLAN NOTE
Diabetes is improving with treatment.   Continue current treatment regimen.  Reminded to bring in blood sugar diary at next visit.  Dietary recommendations for ADA diet.  Regular aerobic exercise.  Discussed foot care.  Reminded to get yearly retinal exam.  Instructed to monitor glucose daily at home  Diabetes will be reassessed with PCP at next follow-up.

## 2021-11-13 DIAGNOSIS — E11.8 TYPE 2 DIABETES MELLITUS WITH COMPLICATION, WITHOUT LONG-TERM CURRENT USE OF INSULIN (HCC): ICD-10-CM

## 2022-05-04 DIAGNOSIS — E11.8 TYPE 2 DIABETES MELLITUS WITH COMPLICATION, WITHOUT LONG-TERM CURRENT USE OF INSULIN: ICD-10-CM

## 2022-05-04 RX ORDER — GLIMEPIRIDE 4 MG/1
TABLET ORAL
Qty: 30 TABLET | Refills: 1 | Status: SHIPPED | OUTPATIENT
Start: 2022-05-04 | End: 2022-11-07

## 2022-11-05 DIAGNOSIS — E11.8 TYPE 2 DIABETES MELLITUS WITH COMPLICATION, WITHOUT LONG-TERM CURRENT USE OF INSULIN: ICD-10-CM

## 2022-11-07 RX ORDER — GLIMEPIRIDE 4 MG/1
TABLET ORAL
Qty: 30 TABLET | Refills: 0 | Status: SHIPPED | OUTPATIENT
Start: 2022-11-07 | End: 2023-01-19 | Stop reason: SDUPTHER

## 2022-11-07 NOTE — TELEPHONE ENCOUNTER
Rx Refill Note  Requested Prescriptions     Pending Prescriptions Disp Refills   • glimepiride (AMARYL) 4 MG tablet [Pharmacy Med Name: Glimepiride 4 MG Oral Tablet] 30 tablet 0     Sig: TAKE 1 TABLET BY MOUTH ONCE DAILY IN THE MORNING BEFORE BREAKFAST      Last office visit with prescribing clinician: Visit date not found      Next office visit with prescribing clinician: Visit date not found            Ct Choudhary MA  11/07/22, 08:18 EST

## 2023-01-19 ENCOUNTER — OFFICE VISIT (OUTPATIENT)
Dept: FAMILY MEDICINE CLINIC | Facility: CLINIC | Age: 48
End: 2023-01-19
Payer: COMMERCIAL

## 2023-01-19 VITALS
HEART RATE: 87 BPM | WEIGHT: 165.2 LBS | BODY MASS INDEX: 26.55 KG/M2 | OXYGEN SATURATION: 98 % | HEIGHT: 66 IN | SYSTOLIC BLOOD PRESSURE: 160 MMHG | DIASTOLIC BLOOD PRESSURE: 110 MMHG | TEMPERATURE: 98 F

## 2023-01-19 DIAGNOSIS — I10 HYPERTENSION, UNSPECIFIED TYPE: ICD-10-CM

## 2023-01-19 DIAGNOSIS — Z23 IMMUNIZATION DUE: ICD-10-CM

## 2023-01-19 DIAGNOSIS — E11.8 TYPE 2 DIABETES MELLITUS WITH COMPLICATION, WITHOUT LONG-TERM CURRENT USE OF INSULIN: ICD-10-CM

## 2023-01-19 DIAGNOSIS — Z00.00 ANNUAL PHYSICAL EXAM: Primary | ICD-10-CM

## 2023-01-19 DIAGNOSIS — Z12.11 COLON CANCER SCREENING: ICD-10-CM

## 2023-01-19 DIAGNOSIS — Z12.31 SCREENING MAMMOGRAM FOR BREAST CANCER: ICD-10-CM

## 2023-01-19 LAB
EXPIRATION DATE: NORMAL
HBA1C MFR BLD: 12.3 %
Lab: NORMAL

## 2023-01-19 PROCEDURE — 99396 PREV VISIT EST AGE 40-64: CPT | Performed by: FAMILY MEDICINE

## 2023-01-19 PROCEDURE — 90471 IMMUNIZATION ADMIN: CPT | Performed by: FAMILY MEDICINE

## 2023-01-19 PROCEDURE — 90686 IIV4 VACC NO PRSV 0.5 ML IM: CPT | Performed by: FAMILY MEDICINE

## 2023-01-19 PROCEDURE — 90677 PCV20 VACCINE IM: CPT | Performed by: FAMILY MEDICINE

## 2023-01-19 PROCEDURE — 90472 IMMUNIZATION ADMIN EACH ADD: CPT | Performed by: FAMILY MEDICINE

## 2023-01-19 PROCEDURE — 83036 HEMOGLOBIN GLYCOSYLATED A1C: CPT | Performed by: FAMILY MEDICINE

## 2023-01-19 RX ORDER — LANCETS 30 GAUGE
1 EACH MISCELLANEOUS DAILY
Qty: 100 EACH | Refills: 3 | Status: SHIPPED | OUTPATIENT
Start: 2023-01-19

## 2023-01-19 RX ORDER — LISINOPRIL 10 MG/1
10 TABLET ORAL DAILY
Qty: 90 TABLET | Refills: 1 | Status: SHIPPED | OUTPATIENT
Start: 2023-01-19

## 2023-01-19 RX ORDER — GLIMEPIRIDE 4 MG/1
4 TABLET ORAL
Qty: 30 TABLET | Refills: 2 | Status: SHIPPED | OUTPATIENT
Start: 2023-01-19

## 2023-01-19 NOTE — ASSESSMENT & PLAN NOTE
Diabetes is worsening.   Reminded to bring in blood sugar diary at next visit.  Dietary recommendations for ADA diet.  Medication changes per orders.  Diabetes will be reassessed in 3 months..    Restart patient's medications including metformin 500 mg twice daily and glimepiride 4 mg daily

## 2023-01-19 NOTE — ASSESSMENT & PLAN NOTE
Hypertension is newly identified.  Dietary sodium restriction.  Weight loss.  Medication changes per orders.  Ambulatory blood pressure monitoring.  Blood pressure will be reassessed in 3 months.

## 2023-01-19 NOTE — ASSESSMENT & PLAN NOTE
Annual wellness exam completed today. Health Maintenance including immunizations was updated and reflected in the chart. Yearly screening labs were ordered.     Further recommendations to be given once lab data received.     Health advice: healthy food choices with fresh fruits and vegetables, maintain sleep pattern at least 8 hours, avoid texting and distracted driving practices; wear safety belt, engage in regular exercise, maintain healthy weight, use safe sex practices, avoid alcohol and illicit drugs. Maintain immunizations that are up to date. Maintain health maintenance: Colonoscopy  Mammo, PAP, etc.  Follow up with PCP if struggling with depression or anxiety. Keep regular dental and eye exams. Brush and floss teeth daily.     F/U annually and prn.

## 2023-01-19 NOTE — PROGRESS NOTES
Subjective     Chief Complaint  Diabetes (Needs refills on DM medications. Ran out 4 months ago)    Subjective          Gabriel St is a 47 y.o. female who presents today to [facility] for follow up.    HPI:   History of Present Illness     Due to language barrier, an  was present during the history-taking and subsequent discussion (and for part of the physical exam) with this patient.      Patient is a pleasant 47-year-old female presents today for a annual physical exam and to follow-up on chronic medical conditions.  This includes diabetes and hypertension.  She has no acute complaints today.    She reports she has been without her medications for about 4 to 5 months.       The following portions of the patient's history were reviewed and updated as appropriate: allergies, current medications, past family history, past medical history, past social history, past surgical history and problem list.    Objective     Objective     Allergy:   No Known Allergies     Current Medications:   Current Outpatient Medications   Medication Sig Dispense Refill   • glimepiride (AMARYL) 4 MG tablet Take 1 tablet by mouth Every Morning Before Breakfast. 30 tablet 2   • glucose blood test strip 1 each by Other route Daily. For E11.9 100 each 3   • glucose monitor monitoring kit 1 each 2 (two) times a day. For E11.9 1 each 0   • Lancets misc 1 each Daily. For E11.9 100 each 3   • metFORMIN (GLUCOPHAGE) 500 MG tablet Take 1 tablet by mouth 2 (Two) Times a Day With Meals. 60 tablet 2   • lisinopril (PRINIVIL,ZESTRIL) 10 MG tablet Take 1 tablet by mouth Daily. 90 tablet 1     No current facility-administered medications for this visit.       Past Medical History:  Past Medical History:   Diagnosis Date   • Diabetes mellitus (HCC)        Past Surgical History:  No past surgical history on file.    Social History:  Social History     Socioeconomic History   • Marital status:  "   Tobacco Use   • Smoking status: Never     Passive exposure: Never   • Smokeless tobacco: Never   Substance and Sexual Activity   • Alcohol use: No   • Drug use: No   • Sexual activity: Defer       Family History:  No family history on file.    Review of Systems:  Review of Systems   Constitutional: Negative for activity change, appetite change and fatigue.   Respiratory: Negative for cough, chest tightness, shortness of breath and wheezing.    Cardiovascular: Negative for chest pain, palpitations and leg swelling.   Gastrointestinal: Negative for abdominal pain, blood in stool, constipation and diarrhea.   Allergic/Immunologic: Negative for environmental allergies.   Neurological: Negative for dizziness, facial asymmetry, light-headedness and numbness.   Psychiatric/Behavioral: Negative for agitation, hallucinations, self-injury and suicidal ideas.       Vital Signs:   BP (!) 160/110   Pulse 87   Temp 98 °F (36.7 °C) (Infrared)   Ht 167.6 cm (65.98\")   Wt 74.9 kg (165 lb 3.2 oz)   SpO2 98%   BMI 26.68 kg/m²      Physical Exam:  Physical Exam  Constitutional:       Appearance: She is normal weight.   HENT:      Head: Normocephalic.   Cardiovascular:      Rate and Rhythm: Normal rate and regular rhythm.      Pulses: Normal pulses.      Heart sounds: No murmur heard.  Pulmonary:      Effort: Pulmonary effort is normal. No respiratory distress.      Breath sounds: No wheezing.   Abdominal:      General: Abdomen is flat. Bowel sounds are normal.   Musculoskeletal:         General: No swelling or tenderness.      Cervical back: Normal range of motion.   Neurological:      General: No focal deficit present.      Mental Status: She is alert. Mental status is at baseline.   Psychiatric:         Mood and Affect: Mood normal.         Behavior: Behavior normal.         Thought Content: Thought content normal.         Judgment: Judgment normal.               PHQ-9 Score  PHQ-9 Total Score: 1     Lab " Review  Office Visit on 01/19/2023   Component Date Value Ref Range Status   • Hemoglobin A1C 01/19/2023 12.3  % Final   • Lot Number 01/19/2023 [id number]   Final   • Expiration Date 01/19/2023 10/11/24   Final        Radiology Results        Assessment / Plan         Assessment and Plan   Diagnoses and all orders for this visit:    1. Annual physical exam (Primary)  Assessment & Plan:  Annual wellness exam completed today. Health Maintenance including immunizations was updated and reflected in the chart. Yearly screening labs were ordered.     Further recommendations to be given once lab data received.     Health advice: healthy food choices with fresh fruits and vegetables, maintain sleep pattern at least 8 hours, avoid texting and distracted driving practices; wear safety belt, engage in regular exercise, maintain healthy weight, use safe sex practices, avoid alcohol and illicit drugs. Maintain immunizations that are up to date. Maintain health maintenance: Colonoscopy  Mammo, PAP, etc.  Follow up with PCP if struggling with depression or anxiety. Keep regular dental and eye exams. Brush and floss teeth daily.     F/U annually and prn.       Orders:  -     CBC & Differential; Future  -     Comprehensive Metabolic Panel; Future  -     Lipid Panel; Future  -     TSH Rfx On Abnormal To Free T4; Future  -     Microalbumin / Creatinine Urine Ratio - Urine, Clean Catch; Future    2. Type 2 diabetes mellitus with complication, without long-term current use of insulin (HCC)  Assessment & Plan:  Diabetes is worsening.   Reminded to bring in blood sugar diary at next visit.  Dietary recommendations for ADA diet.  Medication changes per orders.  Diabetes will be reassessed in 3 months..    Restart patient's medications including metformin 500 mg twice daily and glimepiride 4 mg daily    Orders:  -     POC Glycosylated Hemoglobin (Hb A1C)  -     glimepiride (AMARYL) 4 MG tablet; Take 1 tablet by mouth Every Morning Before  Breakfast.  Dispense: 30 tablet; Refill: 2  -     metFORMIN (GLUCOPHAGE) 500 MG tablet; Take 1 tablet by mouth 2 (Two) Times a Day With Meals.  Dispense: 60 tablet; Refill: 2  -     glucose blood test strip; 1 each by Other route Daily. For E11.9  Dispense: 100 each; Refill: 3  -     Lancets misc; 1 each Daily. For E11.9  Dispense: 100 each; Refill: 3  -     Lipid Panel; Future  -     Microalbumin / Creatinine Urine Ratio - Urine, Clean Catch; Future    3. Hypertension, unspecified type  Assessment & Plan:  Hypertension is newly identified.  Dietary sodium restriction.  Weight loss.  Medication changes per orders.  Ambulatory blood pressure monitoring.  Blood pressure will be reassessed in 3 months.    Orders:  -     lisinopril (PRINIVIL,ZESTRIL) 10 MG tablet; Take 1 tablet by mouth Daily.  Dispense: 90 tablet; Refill: 1    4. Immunization due  -     FluLaval/Fluzone >6 mos ([id number])  -     Pneumococcal Conjugate Vaccine 20-Valent All    5. Screening mammogram for breast cancer  -     Mammo Screening Digital Tomosynthesis Bilateral With CAD; Future    6. Colon cancer screening  -     Cologuard - Stool, Per Rectum; Future      Discussed possible differential diagnoses, testing, treatment, recommended non-pharmacological interventions, risks, warning signs to monitor for that would indicate need for follow-up in clinic or ER. If no improvement with these regimens or you have new or worsening symptoms follow-up. Patient verbalizes understanding and agreement with plan of care. Denies further needs or concerns.     Patient was given instructions and counseling regarding her condition and for health maintenance advised.    BMI is >= 25 and <30. (Overweight) The following options were offered after discussion;: weight loss educational material (shared in after visit summary)           Health Maintenance  Health Maintenance:   Health Maintenance Due   Topic Date Due   • Hepatitis B (1 of 3 - 3-dose series) Never done   •  URINE MICROALBUMIN  Never done   • COLORECTAL CANCER SCREENING  Never done   • TDAP/TD VACCINES (1 - Tdap) Never done   • HEPATITIS C SCREENING  Never done   • DIABETIC FOOT EXAM  Never done   • PAP SMEAR  Never done   • DIABETIC EYE EXAM  Never done   • COVID-19 Vaccine (2 - Mixed Product series) 2022        Meds ordered during this visit  New Medications Ordered This Visit   Medications   • glimepiride (AMARYL) 4 MG tablet     Sig: Take 1 tablet by mouth Every Morning Before Breakfast.     Dispense:  30 tablet     Refill:  2   • metFORMIN (GLUCOPHAGE) 500 MG tablet     Sig: Take 1 tablet by mouth 2 (Two) Times a Day With Meals.     Dispense:  60 tablet     Refill:  2   • glucose blood test strip     Si each by Other route Daily. For E11.9     Dispense:  100 each     Refill:  3   • Lancets misc     Si each Daily. For E11.9     Dispense:  100 each     Refill:  3   • lisinopril (PRINIVIL,ZESTRIL) 10 MG tablet     Sig: Take 1 tablet by mouth Daily.     Dispense:  90 tablet     Refill:  1       Meds stopped during this visit:  Medications Discontinued During This Encounter   Medication Reason   • glucose blood test strip Reorder   • Lancets misc Reorder   • metFORMIN (GLUCOPHAGE) 500 MG tablet Reorder   • glimepiride (AMARYL) 4 MG tablet Reorder        Visit Diagnoses    ICD-10-CM ICD-9-CM   1. Annual physical exam  Z00.00 V70.0   2. Type 2 diabetes mellitus with complication, without long-term current use of insulin (HCC)  E11.8 250.90   3. Hypertension, unspecified type  I10 401.9   4. Immunization due  Z23 V05.9   5. Screening mammogram for breast cancer  Z12.31 V76.12   6. Colon cancer screening  Z12.11 V76.51       Patient was given instructions and counseling regarding her condition or for health maintenance advice. Please see specific information pulled into the AVS if appropriate.     Follow Up   Return in about 3 months (around 2023) for Recheck.      This document has been electronically  signed by Lili Molina DO   January 19, 2023 11:08 EST    Dictated Utilizing Dragon Dictation: Part of this note may be an electronic transcription/translation of spoken language to printed text using the Dragon Dictation System.    Lili Molina D.O.  [facility] [place]

## 2023-04-06 ENCOUNTER — TELEPHONE (OUTPATIENT)
Dept: FAMILY MEDICINE CLINIC | Facility: CLINIC | Age: 48
End: 2023-04-06
Payer: COMMERCIAL

## 2023-04-06 DIAGNOSIS — E11.8 TYPE 2 DIABETES MELLITUS WITH COMPLICATION, WITHOUT LONG-TERM CURRENT USE OF INSULIN: ICD-10-CM

## 2023-04-06 NOTE — TELEPHONE ENCOUNTER
Rx Refill Note  Requested Prescriptions     Pending Prescriptions Disp Refills   • metFORMIN (GLUCOPHAGE) 500 MG tablet [Pharmacy Med Name: metFORMIN HCl 500 MG Oral Tablet] 180 tablet 0     Sig: TAKE 1 TABLET BY MOUTH TWICE DAILY WITH MEALS      Last office visit with prescribing clinician: 1/19/2023   Last telemedicine visit with prescribing clinician: 4/20/2023   Next office visit with prescribing clinician: 4/20/2023                         Would you like a call back once the refill request has been completed: [] Yes [] No    If the office needs to give you a call back, can they leave a voicemail: [] Yes [] No    Ct Choudhary MA  04/06/23, 16:24 EDT

## 2023-04-06 NOTE — TELEPHONE ENCOUNTER
Hub and front can read      Tried to reach the pt top reminder that she had some labs ordered by Dr. Molina, and that she can have them drawn at any [facility]. If they have any questions or is unable to have these drawn she can give us a call back

## 2023-04-18 ENCOUNTER — TELEPHONE (OUTPATIENT)
Dept: FAMILY MEDICINE CLINIC | Facility: CLINIC | Age: 48
End: 2023-04-18
Payer: COMMERCIAL

## 2023-04-18 NOTE — TELEPHONE ENCOUNTER
HUB TO READ  CONTACTED PATIENT TO LET HIM KNOW THAT WE NEED TO CHANGE HIS APPOINTMENT WITH DR DIAMOND ON MAY THE 6TH AS SHE WILL NO LONGER BE IN ON THAT Saturday AND SHE WILL BE IN ON April 29 INSTEAD

## 2023-06-01 ENCOUNTER — TELEPHONE (OUTPATIENT)
Dept: FAMILY MEDICINE CLINIC | Facility: CLINIC | Age: 48
End: 2023-06-01

## 2023-07-17 PROBLEM — E13.00 HYPEROSMOLARITY DUE TO SECONDARY DIABETES MELLITUS: Status: ACTIVE | Noted: 2023-07-17

## 2023-11-09 ENCOUNTER — TELEPHONE (OUTPATIENT)
Dept: FAMILY MEDICINE CLINIC | Facility: CLINIC | Age: 48
End: 2023-11-09
Payer: COMMERCIAL

## 2023-11-09 NOTE — TELEPHONE ENCOUNTER
Your ordered labs on this patient in January.  She has not returned to have them completed.   She was seen in office in July-  labs from January were not completed.   Can these orders be canceled?

## 2024-08-10 ENCOUNTER — OFFICE VISIT (OUTPATIENT)
Dept: FAMILY MEDICINE CLINIC | Facility: CLINIC | Age: 49
End: 2024-08-10
Payer: COMMERCIAL

## 2024-08-10 VITALS
BODY MASS INDEX: 25.39 KG/M2 | HEART RATE: 84 BPM | TEMPERATURE: 98.9 F | WEIGHT: 158 LBS | DIASTOLIC BLOOD PRESSURE: 78 MMHG | HEIGHT: 66 IN | SYSTOLIC BLOOD PRESSURE: 140 MMHG

## 2024-08-10 DIAGNOSIS — R10.9 ABDOMINAL DISCOMFORT: ICD-10-CM

## 2024-08-10 DIAGNOSIS — Z23 IMMUNIZATION DUE: ICD-10-CM

## 2024-08-10 DIAGNOSIS — Z12.11 ENCOUNTER FOR SCREENING FOR MALIGNANT NEOPLASM OF COLON: ICD-10-CM

## 2024-08-10 DIAGNOSIS — Z00.00 ENCOUNTER FOR ANNUAL PHYSICAL EXAM: Primary | ICD-10-CM

## 2024-08-10 DIAGNOSIS — E11.8 TYPE 2 DIABETES MELLITUS WITH COMPLICATION, WITHOUT LONG-TERM CURRENT USE OF INSULIN: ICD-10-CM

## 2024-08-10 DIAGNOSIS — I10 HYPERTENSION, UNSPECIFIED TYPE: ICD-10-CM

## 2024-08-10 DIAGNOSIS — Z91.199 NONCOMPLIANCE: ICD-10-CM

## 2024-08-10 DIAGNOSIS — Z12.31 ENCOUNTER FOR SCREENING MAMMOGRAM FOR MALIGNANT NEOPLASM OF BREAST: ICD-10-CM

## 2024-08-10 DIAGNOSIS — R22.1 MASS IN NECK: ICD-10-CM

## 2024-08-10 DIAGNOSIS — M25.532 LEFT WRIST PAIN: ICD-10-CM

## 2024-08-10 DIAGNOSIS — Z11.59 NEED FOR HEPATITIS C SCREENING TEST: ICD-10-CM

## 2024-08-10 LAB
EXPIRATION DATE: ABNORMAL
HBA1C MFR BLD: 11 % (ref 4.5–5.7)
Lab: ABNORMAL

## 2024-08-10 RX ORDER — LISINOPRIL 10 MG/1
10 TABLET ORAL DAILY
Qty: 90 TABLET | Refills: 0 | Status: SHIPPED | OUTPATIENT
Start: 2024-08-10

## 2024-08-10 RX ORDER — MELOXICAM 15 MG/1
15 TABLET ORAL DAILY PRN
Qty: 30 TABLET | Refills: 2 | Status: SHIPPED | OUTPATIENT
Start: 2024-08-10

## 2024-08-10 RX ORDER — GLIMEPIRIDE 4 MG/1
4 TABLET ORAL
Qty: 90 TABLET | Refills: 0 | Status: SHIPPED | OUTPATIENT
Start: 2024-08-10

## 2024-08-10 NOTE — ASSESSMENT & PLAN NOTE
Annual wellness exam completed today. Health Maintenance including immunizations was updated and reflected in the chart. Yearly screening labs were ordered.     Further recommendations to be given once lab data received.     Health advice: healthy food choices with fresh fruits and vegetables, maintain sleep pattern at least 8 hours, avoid texting and distracted driving practices; wear safety belt, engage in regular exercise, maintain healthy weight, use safe sex practices, avoid alcohol and illicit drugs. Maintain immunizations that are up to date. Maintain health maintenance, Colonoscopy, Mammo, PAP, etc.  Follow up with PCP if struggling with depression or anxiety. Keep regular dental and eye exams. Brush and floss teeth daily.     I suggest they take a daily multivitamin that is age-appropriate (example women's One-A-Day.)  Patient voiced understanding of these instructions.     Follow up Annually for Physical

## 2024-08-10 NOTE — ASSESSMENT & PLAN NOTE
Hypertension is borderline  Dietary sodium restriction.  Weight loss.  Regular aerobic exercise.  Ambulatory blood pressure monitoring.  Blood pressure will be reassessedin 3 months.  Target BP <140/90

## 2024-08-10 NOTE — ASSESSMENT & PLAN NOTE
Area is about 1.5 cm in diameter, round, smooth is mobile.  Most likely a lymph node.  Ordering ultrasound for further evaluation.

## 2024-08-10 NOTE — ASSESSMENT & PLAN NOTE
Diabetes is uncontrolled.   Medication changes per orders.  Recommended an ADA diet.  Regular aerobic exercise.  Reminded to get yearly retinal exam.  Diabetes will be reassessed in 3 months  - Referring to diabetes Education  - she has not been checking her blood sugar as she is unsure on how to use the glucometer.  I demonstrated for her in office today.  She has not been taking the Mounjaro consistently therefore I am not changing the dosage for now.

## 2024-08-10 NOTE — PROGRESS NOTES
Subjective     Chief Complaint  Annual Exam and Abdominal Pain (Low R quadrant pain has had for awhile/Also states that she has pain in veins on L wrist)    Subjective          Gabriel St is a 48 y.o. female who presents today to [facility] for follow up.    HPI:   Abdominal Pain  Pertinent negatives include no constipation or diarrhea.        Due to language barrier, an  was present during the history-taking and subsequent discussion (and for part of the physical exam) with this patient. - her spouse is with her acting as      I have not seen her in greater that one year.     Patient is a pleasant 47-year-old female presents today for follow-up on chronic medical conditions.  This includes diabetes and hypertension.      She has not been checking her blood sugar consistently as she does not have access to her supplies.  She is prescribed Metformin 1,000 mg daily, Glimepiride 4 mg daily and Mounjaro 2.5 mg weekly. Her HgA1c in office today is 11.0% one year ago, her HgA1c was 12.8%  - she hasn't been taking Mounjaro consistently due to confusion on how to use the pens.     She has intermittent abdominal discomfort on the right lower abdominal area. She has never had a colonoscopy. She is post menopausal. Her last menstrual cycle was in 2017. She has a history of appendectomy 4 years ago.  Additionally, she has having some left wrist pain that is only intermittent and occurs when she is using the arm/hand a lot.  Another acute complaint she has is a knot on the right side of her neck.  She been present for about 1 year.  It does not hurt unless it is not overly bothersome.  She is not sure if it has changed in size.    The following portions of the patient's history were reviewed and updated as appropriate: allergies, current medications, past family history, past medical history, past social history, past surgical history and problem  list.    Objective     Objective     Allergy:   No Known Allergies     Current Medications:   Current Outpatient Medications   Medication Sig Dispense Refill    glimepiride (AMARYL) 4 MG tablet Take 1 tablet by mouth Every Morning Before Breakfast. 90 tablet 0    glucose blood test strip 1 each by Other route Daily. For E11.9 100 each 3    glucose monitor monitoring kit 1 each 2 (two) times a day. For E11.9 1 each 0    Lancets misc 1 each Daily. For E11.9 100 each 3    lisinopril (PRINIVIL,ZESTRIL) 10 MG tablet Take 1 tablet by mouth Daily. 90 tablet 0    metFORMIN (GLUCOPHAGE) 1000 MG tablet Take 1 tablet by mouth 2 (Two) Times a Day With Meals. 180 tablet 0    Tirzepatide (Mounjaro) 2.5 MG/0.5ML solution pen-injector pen Inject 0.5 mL under the skin into the appropriate area as directed 1 (One) Time Per Week. 3 mL 0    meloxicam (Mobic) 15 MG tablet Take 1 tablet by mouth Daily As Needed for Mild Pain. 30 tablet 2     No current facility-administered medications for this visit.       Past Medical History:  Past Medical History:   Diagnosis Date    Diabetes mellitus        Past Surgical History:  Past Surgical History:   Procedure Laterality Date    APPENDECTOMY         Social History:  Social History     Socioeconomic History    Marital status:    Tobacco Use    Smoking status: Never     Passive exposure: Never    Smokeless tobacco: Never   Vaping Use    Vaping status: Never Used   Substance and Sexual Activity    Alcohol use: No    Drug use: No    Sexual activity: Defer       Family History:  History reviewed. No pertinent family history.    Review of Systems:  Review of Systems   Constitutional:  Negative for activity change, appetite change and fatigue.   Respiratory:  Negative for cough, chest tightness, shortness of breath and wheezing.    Cardiovascular:  Negative for chest pain, palpitations and leg swelling.   Gastrointestinal:  Positive for abdominal pain. Negative for blood in stool, constipation  "and diarrhea.   Allergic/Immunologic: Negative for environmental allergies.   Neurological:  Negative for dizziness, facial asymmetry, light-headedness and numbness.   Psychiatric/Behavioral:  Negative for agitation, hallucinations, self-injury and suicidal ideas.        Vital Signs:   /78   Pulse 84   Temp 98.9 °F (37.2 °C) (Infrared)   Ht 167.6 cm (65.98\")   Wt 71.7 kg (158 lb)   BMI 25.51 kg/m²      Physical Exam:  Physical Exam  Constitutional:       Appearance: She is normal weight.   HENT:      Head: Normocephalic.   Cardiovascular:      Rate and Rhythm: Normal rate.      Pulses: Normal pulses.   Pulmonary:      Effort: Pulmonary effort is normal.   Abdominal:      General: There is no distension.      Palpations: Abdomen is soft.      Tenderness: There is no abdominal tenderness.   Musculoskeletal:         General: No swelling or tenderness.      Cervical back: Normal range of motion.   Neurological:      General: No focal deficit present.      Mental Status: She is alert. Mental status is at baseline.   Psychiatric:         Behavior: Behavior normal.               PHQ-9 Score  PHQ-9 Total Score: 0     Lab Review  Office Visit on 08/10/2024   Component Date Value Ref Range Status    Hemoglobin A1C 08/10/2024 11.0 (A)  4.5 - 5.7 % Final    Lot Number 08/10/2024 [id number]   Final    Expiration Date 08/10/2024 4/4/26   Final        Radiology Results        Assessment / Plan         Assessment and Plan   Diagnoses and all orders for this visit:    1. Encounter for annual physical exam (Primary)  Assessment & Plan:  Annual wellness exam completed today. Health Maintenance including immunizations was updated and reflected in the chart. Yearly screening labs were ordered.     Further recommendations to be given once lab data received.     Health advice: healthy food choices with fresh fruits and vegetables, maintain sleep pattern at least 8 hours, avoid texting and distracted driving practices; wear " safety belt, engage in regular exercise, maintain healthy weight, use safe sex practices, avoid alcohol and illicit drugs. Maintain immunizations that are up to date. Maintain health maintenance, Colonoscopy, Mammo, PAP, etc.  Follow up with PCP if struggling with depression or anxiety. Keep regular dental and eye exams. Brush and floss teeth daily.     I suggest they take a daily multivitamin that is age-appropriate (example women's One-A-Day.)  Patient voiced understanding of these instructions.     Follow up Annually for Physical       Orders:  -     CBC & Differential; Future  -     Comprehensive Metabolic Panel; Future  -     Lipid Panel; Future  -     TSH Rfx On Abnormal To Free T4; Future  -     Vitamin B12; Future  -     Vitamin D,25-Hydroxy; Future  -     Microalbumin / Creatinine Urine Ratio - Urine, Clean Catch; Future    2. Type 2 diabetes mellitus with complication, without long-term current use of insulin  Assessment & Plan:  Diabetes is uncontrolled.   Medication changes per orders.  Recommended an ADA diet.  Regular aerobic exercise.  Reminded to get yearly retinal exam.  Diabetes will be reassessed in 3 months  - Referring to diabetes Education  - she has not been checking her blood sugar as she is unsure on how to use the glucometer.  I demonstrated for her in office today.  She has not been taking the Mounjaro consistently therefore I am not changing the dosage for now.    Orders:  -     POC Glycosylated Hemoglobin (Hb A1C)  -     POC Microalbumin  -     glimepiride (AMARYL) 4 MG tablet; Take 1 tablet by mouth Every Morning Before Breakfast.  Dispense: 90 tablet; Refill: 0  -     metFORMIN (GLUCOPHAGE) 1000 MG tablet; Take 1 tablet by mouth 2 (Two) Times a Day With Meals.  Dispense: 180 tablet; Refill: 0  -     Tirzepatide (Mounjaro) 2.5 MG/0.5ML solution pen-injector pen; Inject 0.5 mL under the skin into the appropriate area as directed 1 (One) Time Per Week.  Dispense: 3 mL; Refill: 0  -      CBC & Differential; Future  -     Comprehensive Metabolic Panel; Future  -     Microalbumin / Creatinine Urine Ratio - Urine, Clean Catch; Future    3. Hypertension, unspecified type  Assessment & Plan:  Hypertension is borderline  Dietary sodium restriction.  Weight loss.  Regular aerobic exercise.  Ambulatory blood pressure monitoring.  Blood pressure will be reassessedin 3 months.  Target BP <140/90     Orders:  -     lisinopril (PRINIVIL,ZESTRIL) 10 MG tablet; Take 1 tablet by mouth Daily.  Dispense: 90 tablet; Refill: 0  -     Lipid Panel; Future  -     TSH Rfx On Abnormal To Free T4; Future    4. Immunization due  -     Tdap Vaccine Greater Than or Equal To 8yo IM    5. Noncompliance    6. Encounter for screening for malignant neoplasm of colon  -     Ambulatory Referral For Screening Colonoscopy    7. Encounter for screening mammogram for malignant neoplasm of breast  -     Mammo Screening Digital Tomosynthesis Bilateral With CAD; Future    8. Need for hepatitis C screening test  -     Hepatitis C Antibody; Future    9. Left wrist pain  Assessment & Plan:  No abnormality noted on exam today.  Most likely inflammatory.  Prescribing as needed Mobic    Orders:  -     meloxicam (Mobic) 15 MG tablet; Take 1 tablet by mouth Daily As Needed for Mild Pain.  Dispense: 30 tablet; Refill: 2    10. Mass in neck  Assessment & Plan:  Area is about 1.5 cm in diameter, round, smooth is mobile.  Most likely a lymph node.  Ordering ultrasound for further evaluation.    Orders:  -     US head neck soft tissue; Future    11. Abdominal discomfort  Assessment & Plan:  Most likely adhesion prior appendectomy.  She has not had a colonoscopy therefore I have referred her for this.            Discussed possible differential diagnoses, testing, treatment, recommended non-pharmacological interventions, risks, warning signs to monitor for that would indicate need for follow-up in clinic or ER. If no improvement with these regimens or you  have new or worsening symptoms follow-up. Patient verbalizes understanding and agreement with plan of care. Denies further needs or concerns.     Patient was given instructions and counseling regarding her condition and for health maintenance advised.    BMI is >= 25 and <30. (Overweight) The following options were offered after discussion;: weight loss educational material (shared in after visit summary)           Health Maintenance  Health Maintenance:   Health Maintenance Due   Topic Date Due    MAMMOGRAM  Never done    URINE MICROALBUMIN  Never done    COLORECTAL CANCER SCREENING  Never done    DIABETIC EYE EXAM  Never done    Hepatitis B (1 of 3 - 19+ 3-dose series) Never done    TDAP/TD VACCINES (1 - Tdap) Never done    HEPATITIS C SCREENING  Never done    DIABETIC FOOT EXAM  Never done    PAP SMEAR  Never done    BMI FOLLOWUP  07/17/2024    INFLUENZA VACCINE  08/01/2024        Meds ordered during this visit  New Medications Ordered This Visit   Medications    glimepiride (AMARYL) 4 MG tablet     Sig: Take 1 tablet by mouth Every Morning Before Breakfast.     Dispense:  90 tablet     Refill:  0    metFORMIN (GLUCOPHAGE) 1000 MG tablet     Sig: Take 1 tablet by mouth 2 (Two) Times a Day With Meals.     Dispense:  180 tablet     Refill:  0    Tirzepatide (Mounjaro) 2.5 MG/0.5ML solution pen-injector pen     Sig: Inject 0.5 mL under the skin into the appropriate area as directed 1 (One) Time Per Week.     Dispense:  3 mL     Refill:  0     Please dispense one month supply    lisinopril (PRINIVIL,ZESTRIL) 10 MG tablet     Sig: Take 1 tablet by mouth Daily.     Dispense:  90 tablet     Refill:  0    meloxicam (Mobic) 15 MG tablet     Sig: Take 1 tablet by mouth Daily As Needed for Mild Pain.     Dispense:  30 tablet     Refill:  2       Meds stopped during this visit:  Medications Discontinued During This Encounter   Medication Reason    lisinopril (PRINIVIL,ZESTRIL) 10 MG tablet Reorder    metFORMIN (GLUCOPHAGE)  1000 MG tablet Reorder    glimepiride (AMARYL) 4 MG tablet Reorder    Tirzepatide (Mounjaro) 2.5 MG/0.5ML solution pen-injector Reorder            Visit Diagnoses    ICD-10-CM ICD-9-CM   1. Encounter for annual physical exam  Z00.00 V70.0   2. Type 2 diabetes mellitus with complication, without long-term current use of insulin  E11.8 250.90   3. Hypertension, unspecified type  I10 401.9   4. Immunization due  Z23 V05.9   5. Noncompliance  Z91.199 V15.81   6. Encounter for screening for malignant neoplasm of colon  Z12.11 V76.51   7. Encounter for screening mammogram for malignant neoplasm of breast  Z12.31 V76.12   8. Need for hepatitis C screening test  Z11.59 V73.89   9. Left wrist pain  M25.532 719.43   10. Mass in neck  R22.1 784.2   11. Abdominal discomfort  R10.9 789.00       Patient was given instructions and counseling regarding her condition or for health maintenance advice. Please see specific information pulled into the AVS if appropriate.     Follow Up   Return in about 3 months (around 11/10/2024) for followup DM, HTN, HLD .    I spent > 40 minutes caring for Gabriel on this date of service. This time includes time spent by me in the following activities:preparing for the visit, reviewing tests, obtaining and/or reviewing a separately obtained history, performing a medically appropriate examination and/or evaluation , counseling and educating the patient/family/caregiver, ordering medications, tests, or procedures, referring and communicating with other health care professionals , documenting information in the medical record, and independently interpreting results and communicating that information with the patient/family/caregiver      This document has been electronically signed by Lili Molina DO   August 10, 2024 10:30 EDT    Dictated Utilizing Dragon Dictation: Part of this note may be an electronic transcription/translation of spoken language to printed text using the Dragon Dictation  System.    Lili Molina D.O.  [facility] [place]

## 2024-08-10 NOTE — ASSESSMENT & PLAN NOTE
Most likely adhesion prior appendectomy.  She has not had a colonoscopy therefore I have referred her for this.

## 2024-08-19 ENCOUNTER — LAB (OUTPATIENT)
Dept: LAB | Facility: HOSPITAL | Age: 49
End: 2024-08-19
Payer: COMMERCIAL

## 2024-08-19 DIAGNOSIS — Z00.00 ENCOUNTER FOR ANNUAL PHYSICAL EXAM: ICD-10-CM

## 2024-08-19 DIAGNOSIS — E11.8 TYPE 2 DIABETES MELLITUS WITH COMPLICATION, WITHOUT LONG-TERM CURRENT USE OF INSULIN: ICD-10-CM

## 2024-08-19 DIAGNOSIS — I10 HYPERTENSION, UNSPECIFIED TYPE: ICD-10-CM

## 2024-08-19 DIAGNOSIS — Z11.59 NEED FOR HEPATITIS C SCREENING TEST: ICD-10-CM

## 2024-08-19 DIAGNOSIS — D64.9 ANEMIA, UNSPECIFIED TYPE: ICD-10-CM

## 2024-08-19 LAB
ALBUMIN SERPL-MCNC: 4.2 G/DL (ref 3.5–5.2)
ALBUMIN/GLOB SERPL: 1.2 G/DL
ALP SERPL-CCNC: 102 U/L (ref 39–117)
ALT SERPL W P-5'-P-CCNC: 29 U/L (ref 1–33)
ANION GAP SERPL CALCULATED.3IONS-SCNC: 13.1 MMOL/L (ref 5–15)
AST SERPL-CCNC: 21 U/L (ref 1–32)
BILIRUB SERPL-MCNC: 0.5 MG/DL (ref 0–1.2)
BUN SERPL-MCNC: 16 MG/DL (ref 6–20)
BUN/CREAT SERPL: 24.6 (ref 7–25)
CALCIUM SPEC-SCNC: 10 MG/DL (ref 8.6–10.5)
CHLORIDE SERPL-SCNC: 105 MMOL/L (ref 98–107)
CHOLEST SERPL-MCNC: 215 MG/DL (ref 0–200)
CO2 SERPL-SCNC: 23.9 MMOL/L (ref 22–29)
CREAT SERPL-MCNC: 0.65 MG/DL (ref 0.57–1)
EGFRCR SERPLBLD CKD-EPI 2021: 108.1 ML/MIN/1.73
GLOBULIN UR ELPH-MCNC: 3.4 GM/DL
GLUCOSE SERPL-MCNC: 223 MG/DL (ref 65–99)
HDLC SERPL-MCNC: 45 MG/DL (ref 40–60)
LDLC SERPL CALC-MCNC: 136 MG/DL (ref 0–100)
LDLC/HDLC SERPL: 2.92 {RATIO}
POTASSIUM SERPL-SCNC: 3.8 MMOL/L (ref 3.5–5.2)
PROT SERPL-MCNC: 7.6 G/DL (ref 6–8.5)
SODIUM SERPL-SCNC: 142 MMOL/L (ref 136–145)
TRIGL SERPL-MCNC: 192 MG/DL (ref 0–150)
TSH SERPL DL<=0.05 MIU/L-ACNC: 2 UIU/ML (ref 0.27–4.2)
VLDLC SERPL-MCNC: 34 MG/DL (ref 5–40)

## 2024-08-19 PROCEDURE — 82728 ASSAY OF FERRITIN: CPT

## 2024-08-19 PROCEDURE — 86803 HEPATITIS C AB TEST: CPT

## 2024-08-19 PROCEDURE — 82306 VITAMIN D 25 HYDROXY: CPT

## 2024-08-19 PROCEDURE — 82746 ASSAY OF FOLIC ACID SERUM: CPT

## 2024-08-19 PROCEDURE — 80050 GENERAL HEALTH PANEL: CPT

## 2024-08-19 PROCEDURE — 36415 COLL VENOUS BLD VENIPUNCTURE: CPT

## 2024-08-19 PROCEDURE — 84466 ASSAY OF TRANSFERRIN: CPT

## 2024-08-19 PROCEDURE — 82607 VITAMIN B-12: CPT

## 2024-08-19 PROCEDURE — 80061 LIPID PANEL: CPT

## 2024-08-19 PROCEDURE — 83540 ASSAY OF IRON: CPT

## 2024-08-20 DIAGNOSIS — D64.9 ANEMIA, UNSPECIFIED TYPE: Primary | ICD-10-CM

## 2024-08-20 LAB
25(OH)D3 SERPL-MCNC: 16.1 NG/ML (ref 30–100)
BASOPHILS # BLD AUTO: 0.03 10*3/MM3 (ref 0–0.2)
BASOPHILS NFR BLD AUTO: 0.5 % (ref 0–1.5)
DEPRECATED RDW RBC AUTO: 35.9 FL (ref 37–54)
EOSINOPHIL # BLD AUTO: 0.1 10*3/MM3 (ref 0–0.4)
EOSINOPHIL NFR BLD AUTO: 1.6 % (ref 0.3–6.2)
ERYTHROCYTE [DISTWIDTH] IN BLOOD BY AUTOMATED COUNT: 12 % (ref 12.3–15.4)
FERRITIN SERPL-MCNC: 271 NG/ML (ref 13–150)
FOLATE SERPL-MCNC: 14.3 NG/ML (ref 4.78–24.2)
HCT VFR BLD AUTO: 33.6 % (ref 34–46.6)
HCV AB SER QL: NORMAL
HGB BLD-MCNC: 10.8 G/DL (ref 12–15.9)
IMM GRANULOCYTES # BLD AUTO: 0.01 10*3/MM3 (ref 0–0.05)
IMM GRANULOCYTES NFR BLD AUTO: 0.2 % (ref 0–0.5)
IRON 24H UR-MRATE: 108 MCG/DL (ref 37–145)
IRON SATN MFR SERPL: 29 % (ref 20–50)
LYMPHOCYTES # BLD AUTO: 2.65 10*3/MM3 (ref 0.7–3.1)
LYMPHOCYTES NFR BLD AUTO: 41.2 % (ref 19.6–45.3)
MCH RBC QN AUTO: 26.9 PG (ref 26.6–33)
MCHC RBC AUTO-ENTMCNC: 32.1 G/DL (ref 31.5–35.7)
MCV RBC AUTO: 83.8 FL (ref 79–97)
MONOCYTES # BLD AUTO: 0.62 10*3/MM3 (ref 0.1–0.9)
MONOCYTES NFR BLD AUTO: 9.6 % (ref 5–12)
NEUTROPHILS NFR BLD AUTO: 3.02 10*3/MM3 (ref 1.7–7)
NEUTROPHILS NFR BLD AUTO: 46.9 % (ref 42.7–76)
NRBC BLD AUTO-RTO: 0 /100 WBC (ref 0–0.2)
PLATELET # BLD AUTO: 271 10*3/MM3 (ref 140–450)
PMV BLD AUTO: 10.2 FL (ref 6–12)
RBC # BLD AUTO: 4.01 10*6/MM3 (ref 3.77–5.28)
TIBC SERPL-MCNC: 371 MCG/DL (ref 298–536)
TRANSFERRIN SERPL-MCNC: 249 MG/DL (ref 200–360)
VIT B12 BLD-MCNC: 531 PG/ML (ref 211–946)
WBC NRBC COR # BLD AUTO: 6.43 10*3/MM3 (ref 3.4–10.8)

## 2024-08-20 RX ORDER — ERGOCALCIFEROL 1.25 MG/1
50000 CAPSULE ORAL WEEKLY
Qty: 12 CAPSULE | Refills: 0 | Status: SHIPPED | OUTPATIENT
Start: 2024-08-20

## 2024-08-21 ENCOUNTER — TELEPHONE (OUTPATIENT)
Dept: FAMILY MEDICINE CLINIC | Facility: CLINIC | Age: 49
End: 2024-08-21
Payer: COMMERCIAL

## 2024-08-21 NOTE — TELEPHONE ENCOUNTER
HUB TO RELAY    LVM FOR PT TO CALL [phone]    Please let patient know that I have reviewed the labs.  They are a little bit anemic but that iron is within normal range.  Ferritin is a little elevated.  The vitamin D is quite low I have sent in a prescription for weekly vitamin D replacement there cholesterol is improved from previous treatment.  Will need to repeat their labs in a couple of months.  Thank you, Dr. Molina

## 2024-08-25 ENCOUNTER — HOSPITAL ENCOUNTER (OUTPATIENT)
Facility: HOSPITAL | Age: 49
Discharge: HOME | End: 2024-08-25
Admitting: FAMILY MEDICINE
Payer: COMMERCIAL

## 2024-08-25 DIAGNOSIS — R22.1 MASS IN NECK: ICD-10-CM

## 2024-08-25 PROCEDURE — 76536 US EXAM OF HEAD AND NECK: CPT

## 2024-09-04 ENCOUNTER — TELEPHONE (OUTPATIENT)
Dept: FAMILY MEDICINE CLINIC | Facility: CLINIC | Age: 49
End: 2024-09-04
Payer: COMMERCIAL

## 2024-09-04 NOTE — TELEPHONE ENCOUNTER
Hub to Relay      Please advise that I have reviewed her US results. The area on her neck is a lipoma, as long as it is not bothersome or changing in size, we can monitor. If it changes, we will need to refer her for removal. Thank you, Dr. Damron     Called and left message for the patient to return our call through the .

## 2024-11-23 DIAGNOSIS — I10 HYPERTENSION, UNSPECIFIED TYPE: ICD-10-CM

## 2024-11-23 DIAGNOSIS — M25.532 LEFT WRIST PAIN: ICD-10-CM

## 2024-11-23 DIAGNOSIS — E11.8 TYPE 2 DIABETES MELLITUS WITH COMPLICATION, WITHOUT LONG-TERM CURRENT USE OF INSULIN: ICD-10-CM

## 2024-11-25 RX ORDER — GLIMEPIRIDE 4 MG/1
4 TABLET ORAL
Qty: 90 TABLET | Refills: 0 | Status: SHIPPED | OUTPATIENT
Start: 2024-11-25

## 2024-11-25 RX ORDER — MELOXICAM 15 MG/1
TABLET ORAL
Qty: 90 TABLET | Refills: 0 | Status: SHIPPED | OUTPATIENT
Start: 2024-11-25

## 2024-11-25 RX ORDER — LISINOPRIL 10 MG/1
10 TABLET ORAL DAILY
Qty: 90 TABLET | Refills: 0 | Status: SHIPPED | OUTPATIENT
Start: 2024-11-25

## 2025-03-31 ENCOUNTER — OFFICE VISIT (OUTPATIENT)
Dept: FAMILY MEDICINE CLINIC | Facility: CLINIC | Age: 50
End: 2025-03-31
Payer: COMMERCIAL

## 2025-03-31 VITALS
SYSTOLIC BLOOD PRESSURE: 142 MMHG | HEIGHT: 66 IN | WEIGHT: 160 LBS | HEART RATE: 76 BPM | TEMPERATURE: 97.7 F | DIASTOLIC BLOOD PRESSURE: 98 MMHG | BODY MASS INDEX: 25.71 KG/M2

## 2025-03-31 DIAGNOSIS — I10 HYPERTENSION, UNSPECIFIED TYPE: ICD-10-CM

## 2025-03-31 DIAGNOSIS — G89.29 CHRONIC DENTAL PAIN: ICD-10-CM

## 2025-03-31 DIAGNOSIS — K08.9 CHRONIC DENTAL PAIN: ICD-10-CM

## 2025-03-31 DIAGNOSIS — K59.00 CONSTIPATION, UNSPECIFIED CONSTIPATION TYPE: ICD-10-CM

## 2025-03-31 DIAGNOSIS — Z12.31 ENCOUNTER FOR SCREENING MAMMOGRAM FOR MALIGNANT NEOPLASM OF BREAST: ICD-10-CM

## 2025-03-31 DIAGNOSIS — E11.8 TYPE 2 DIABETES MELLITUS WITH COMPLICATION, WITHOUT LONG-TERM CURRENT USE OF INSULIN: Primary | ICD-10-CM

## 2025-03-31 DIAGNOSIS — E55.9 VITAMIN D DEFICIENCY: ICD-10-CM

## 2025-03-31 DIAGNOSIS — K04.7 DENTAL ABSCESS: ICD-10-CM

## 2025-03-31 LAB
EXPIRATION DATE: ABNORMAL
HBA1C MFR BLD: 11.2 % (ref 4.5–5.7)
Lab: ABNORMAL

## 2025-03-31 RX ORDER — LISINOPRIL 10 MG/1
10 TABLET ORAL DAILY
Qty: 90 TABLET | Refills: 0 | Status: SHIPPED | OUTPATIENT
Start: 2025-03-31

## 2025-03-31 RX ORDER — POLYETHYLENE GLYCOL 3350 17 G/17G
17 POWDER, FOR SOLUTION ORAL DAILY
Qty: 510 G | Refills: 2 | Status: SHIPPED | OUTPATIENT
Start: 2025-03-31

## 2025-03-31 RX ORDER — GLIMEPIRIDE 4 MG/1
4 TABLET ORAL
Qty: 90 TABLET | Refills: 0 | Status: SHIPPED | OUTPATIENT
Start: 2025-03-31

## 2025-03-31 NOTE — PROGRESS NOTES
Subjective     Chief Complaint  Diabetes (Constipation, headache)    Subjective          Gabriel St is a 49 y.o. female who presents today to [facility] for follow up.    HPI:   History of Present Illness    Due to language barrier, an  was present during the history-taking and subsequent discussion (and for part of the physical exam) with this patient.    History of Present Illness  The patient is a 49-year-old female who presents today to follow up on diabetes, hypertension, constipation, and dental abscess.    She has been non-compliant with her antidiabetic medication regimen due to persumed unavailability. She attempted to procure her medications from [facility] but was informed that she needed to consult with her healthcare provider first.    She expresses reluctance towards initiating antihypertensive therapy, citing concerns about lifelong dependency on the medication. She reports experiencing headaches, which she attributes to dental issues. She plans to discuss this with her dentist but requires a referral from her primary care physician.    She reports bowel movements twice weekly, often accompanied by the urge to defecate but an inability to do so. The consistency of her stools is described as hard.    She has a dental abscess and is seeking a referral to a dentist.    ALLERGIES  The patient has no known allergies.    MEDICATIONS  Discontinued: lisinopril      The following portions of the patient's history were reviewed and updated as appropriate: allergies, current medications, past family history, past medical history, past social history, past surgical history and problem list.    Objective     Objective     Allergy:   No Known Allergies     Current Medications:   Current Outpatient Medications   Medication Sig Dispense Refill    glimepiride (AMARYL) 4 MG tablet Take 1 tablet by mouth Every Morning Before Breakfast. 90 tablet 0    glucose  "blood test strip 1 each by Other route Daily. For E11.9 100 each 3    glucose monitor monitoring kit 1 each 2 (two) times a day. For E11.9 1 each 0    Lancets misc 1 each Daily. For E11.9 100 each 3    lisinopril (PRINIVIL,ZESTRIL) 10 MG tablet Take 1 tablet by mouth Daily. 90 tablet 0    metFORMIN (GLUCOPHAGE) 1000 MG tablet Take 1 tablet by mouth 2 (Two) Times a Day With Meals. 180 tablet 0    Tirzepatide 2.5 MG/0.5ML solution auto-injector Inject 2.5 mg under the skin into the appropriate area as directed 1 (One) Time Per Week. 3 mL 2    vitamin D (ERGOCALCIFEROL) 1.25 MG (48133 UT) capsule capsule Take 1 capsule by mouth 1 (One) Time Per Week. 12 capsule 0    amoxicillin-clavulanate (AUGMENTIN) 875-125 MG per tablet Take 1 tablet by mouth 2 (Two) Times a Day. 20 tablet 0    polyethylene glycol (MIRALAX) 17 GM/SCOOP powder Take 17 g by mouth Daily. 510 g 2     No current facility-administered medications for this visit.       Past Medical History:  Past Medical History:   Diagnosis Date    Diabetes mellitus        Past Surgical History:  Past Surgical History:   Procedure Laterality Date    APPENDECTOMY         Social History:  Social History     Socioeconomic History    Marital status:    Tobacco Use    Smoking status: Never     Passive exposure: Never    Smokeless tobacco: Never   Vaping Use    Vaping status: Never Used   Substance and Sexual Activity    Alcohol use: No    Drug use: No    Sexual activity: Defer       Family History:  History reviewed. No pertinent family history.    Vital Signs:   /98   Pulse 76   Temp 97.7 °F (36.5 °C) (Infrared)   Ht 167.6 cm (65.98\")   Wt 72.6 kg (160 lb)   BMI 25.84 kg/m²      Physical Exam:  Physical Exam  Vitals reviewed.   HENT:      Mouth/Throat:      Dentition: Abnormal dentition. Dental tenderness, gingival swelling, dental caries and dental abscesses present.   Cardiovascular:      Rate and Rhythm: Normal rate and regular rhythm.   Pulmonary:      " Effort: Pulmonary effort is normal. No respiratory distress.      Breath sounds: Normal breath sounds.   Neurological:      Mental Status: She is alert. Mental status is at baseline.   Psychiatric:         Mood and Affect: Mood normal.         Thought Content: Thought content normal.               PHQ-9 Score  PHQ-9 Total Score:      Lab Review  Office Visit on 03/31/2025   Component Date Value Ref Range Status    Hemoglobin A1C 03/31/2025 11.2 (A)  4.5 - 5.7 % Final    Lot Number 03/31/2025 [id number]   Final    Expiration Date 03/31/2025 10/18/26   Final        Radiology Results        Assessment / Plan         Assessment and Plan   Diagnoses and all orders for this visit:    1. Type 2 diabetes mellitus with complication, without long-term current use of insulin (Primary)  -     POC Glycosylated Hemoglobin (Hb A1C)  -     Cancel: POC Albumin/Creatinine Ratio Urine  -     glimepiride (AMARYL) 4 MG tablet; Take 1 tablet by mouth Every Morning Before Breakfast.  Dispense: 90 tablet; Refill: 0  -     metFORMIN (GLUCOPHAGE) 1000 MG tablet; Take 1 tablet by mouth 2 (Two) Times a Day With Meals.  Dispense: 180 tablet; Refill: 0  -     Tirzepatide 2.5 MG/0.5ML solution auto-injector; Inject 2.5 mg under the skin into the appropriate area as directed 1 (One) Time Per Week.  Dispense: 3 mL; Refill: 2  -     Comprehensive Metabolic Panel; Future  -     Lipid Panel; Future  -     TSH Rfx On Abnormal To Free T4; Future    2. Hypertension, unspecified type  -     lisinopril (PRINIVIL,ZESTRIL) 10 MG tablet; Take 1 tablet by mouth Daily.  Dispense: 90 tablet; Refill: 0  -     CBC & Differential; Future    3. Vitamin D deficiency  -     Vitamin D,25-Hydroxy; Future  -     Vitamin B12; Future    4. Chronic dental pain  -     Ambulatory Referral to Dentistry    5. Constipation, unspecified constipation type  -     polyethylene glycol (MIRALAX) 17 GM/SCOOP powder; Take 17 g by mouth Daily.  Dispense: 510 g; Refill: 2    6. Dental  abscess  -     amoxicillin-clavulanate (AUGMENTIN) 875-125 MG per tablet; Take 1 tablet by mouth 2 (Two) Times a Day.  Dispense: 20 tablet; Refill: 0    7. Encounter for screening mammogram for malignant neoplasm of breast  -     Mammo Screening Digital Tomosynthesis Bilateral With CAD; Future        Assessment & Plan  1. Diabetes Mellitus.  Her A1c level is significantly elevated at 11.2, indicating poor glycemic control. She has not been taking her medications due to issues with obtaining them from the pharmacy. All previously prescribed antidiabetic medications have been reissued to her pharmacy, [facility]. She is advised to resume her medication regimen immediately.    2. Hypertension.  Her blood pressure readings are elevated at 142/98. She has been experiencing headaches, which may be attributed to her uncontrolled hypertension. It was explained that antihypertensive medication not only helps manage blood pressure but also protects renal function, especially important given her concurrent diabetes. Lisinopril will be reinitiated to manage her hypertension.    3. Anemia.  She exhibited mild anemia during her last visit. A complete blood count (CBC) will be ordered today to monitor her hemoglobin levels.    4. Constipation.  She reports having bowel movements only twice a week with hard stools. A powder to be mixed with water has been prescribed to facilitate bowel movements and soften the stool for easier passage.    5. Dental Abscess.  She appears to have a dental abscess. A referral to a dentist has been made. An antibiotic regimen, to be taken orally twice daily for 10 days, has been prescribed.    6. Health Maintenance.  She is due for a mammogram. A mammogram has been ordered, and she can schedule it at her convenience. A urine sample will be collected today to check for proteinuria.      Discussed possible differential diagnoses, testing, treatment, recommended non-pharmacological interventions, risks,  warning signs to monitor for that would indicate need for follow-up in clinic or ER. If no improvement with these regimens or you have new or worsening symptoms follow-up. Patient verbalizes understanding and agreement with plan of care. Denies further needs or concerns.     Patient was given instructions and counseling regarding her condition and for health maintenance advised.                  Health Maintenance  Health Maintenance:   Health Maintenance Due   Topic Date Due    DIABETIC FOOT EXAM  Never done    DIABETIC EYE EXAM  Never done    URINE MICROALBUMIN-CREATININE RATIO (uACR)  Never done    Hepatitis B (1 of 3 - 19+ 3-dose series) Never done    TDAP/TD VACCINES (1 - Tdap) Never done    PAP SMEAR  Never done    MAMMOGRAM  Never done    COLORECTAL CANCER SCREENING  Never done        Meds ordered during this visit  New Medications Ordered This Visit   Medications    glimepiride (AMARYL) 4 MG tablet     Sig: Take 1 tablet by mouth Every Morning Before Breakfast.     Dispense:  90 tablet     Refill:  0    metFORMIN (GLUCOPHAGE) 1000 MG tablet     Sig: Take 1 tablet by mouth 2 (Two) Times a Day With Meals.     Dispense:  180 tablet     Refill:  0    Tirzepatide 2.5 MG/0.5ML solution auto-injector     Sig: Inject 2.5 mg under the skin into the appropriate area as directed 1 (One) Time Per Week.     Dispense:  3 mL     Refill:  2    lisinopril (PRINIVIL,ZESTRIL) 10 MG tablet     Sig: Take 1 tablet by mouth Daily.     Dispense:  90 tablet     Refill:  0    polyethylene glycol (MIRALAX) 17 GM/SCOOP powder     Sig: Take 17 g by mouth Daily.     Dispense:  510 g     Refill:  2    amoxicillin-clavulanate (AUGMENTIN) 875-125 MG per tablet     Sig: Take 1 tablet by mouth 2 (Two) Times a Day.     Dispense:  20 tablet     Refill:  0       Meds stopped during this visit:  Medications Discontinued During This Encounter   Medication Reason    meloxicam (MOBIC) 15 MG tablet *Therapy completed    Tirzepatide (Mounjaro) 2.5  MG/0.5ML solution pen-injector pen Reorder    lisinopril (PRINIVIL,ZESTRIL) 10 MG tablet Reorder    glimepiride (AMARYL) 4 MG tablet Reorder    metFORMIN (GLUCOPHAGE) 1000 MG tablet Reorder        Visit Diagnoses    ICD-10-CM ICD-9-CM   1. Type 2 diabetes mellitus with complication, without long-term current use of insulin  E11.8 250.90   2. Hypertension, unspecified type  I10 401.9   3. Vitamin D deficiency  E55.9 268.9   4. Chronic dental pain  K08.9 525.9    G89.29 338.29   5. Constipation, unspecified constipation type  K59.00 564.00   6. Dental abscess  K04.7 522.5   7. Encounter for screening mammogram for malignant neoplasm of breast  Z12.31 V76.12       Patient was given instructions and counseling regarding her condition or for health maintenance advice. Please see specific information pulled into the AVS if appropriate.     Follow Up   Return in about 3 months (around 6/30/2025) for followup Diabetes, HTN .      Patient or patient representative verbalized consent for the use of Ambient Listening during the visit with  Lili Molina DO for chart documentation. 3/31/2025  10:02 EDT      This document has been electronically signed by Lili Molina DO   March 31, 2025 10:49 EDT    Dictated Utilizing Dragon Dictation: Part of this note may be an electronic transcription/translation of spoken language to printed text using the Dragon Dictation System.    Lili Molina D.O.  [facility] [place]